# Patient Record
Sex: MALE | Race: BLACK OR AFRICAN AMERICAN | NOT HISPANIC OR LATINO | URBAN - METROPOLITAN AREA
[De-identification: names, ages, dates, MRNs, and addresses within clinical notes are randomized per-mention and may not be internally consistent; named-entity substitution may affect disease eponyms.]

---

## 2020-07-14 ENCOUNTER — INPATIENT (INPATIENT)
Facility: HOSPITAL | Age: 35
LOS: 1 days | Discharge: LEFT BEFORE DISCHARGE | DRG: 812 | End: 2020-07-16
Attending: STUDENT IN AN ORGANIZED HEALTH CARE EDUCATION/TRAINING PROGRAM | Admitting: STUDENT IN AN ORGANIZED HEALTH CARE EDUCATION/TRAINING PROGRAM
Payer: MEDICAID

## 2020-07-14 VITALS
DIASTOLIC BLOOD PRESSURE: 62 MMHG | RESPIRATION RATE: 18 BRPM | SYSTOLIC BLOOD PRESSURE: 143 MMHG | WEIGHT: 175.05 LBS | OXYGEN SATURATION: 100 % | TEMPERATURE: 98 F | HEART RATE: 88 BPM | HEIGHT: 71 IN

## 2020-07-14 DIAGNOSIS — R74.0 NONSPECIFIC ELEVATION OF LEVELS OF TRANSAMINASE AND LACTIC ACID DEHYDROGENASE [LDH]: ICD-10-CM

## 2020-07-14 DIAGNOSIS — R01.1 CARDIAC MURMUR, UNSPECIFIED: ICD-10-CM

## 2020-07-14 DIAGNOSIS — L03.119 CELLULITIS OF UNSPECIFIED PART OF LIMB: ICD-10-CM

## 2020-07-14 DIAGNOSIS — Z98.890 OTHER SPECIFIED POSTPROCEDURAL STATES: Chronic | ICD-10-CM

## 2020-07-14 DIAGNOSIS — R63.8 OTHER SYMPTOMS AND SIGNS CONCERNING FOOD AND FLUID INTAKE: ICD-10-CM

## 2020-07-14 DIAGNOSIS — D69.6 THROMBOCYTOPENIA, UNSPECIFIED: ICD-10-CM

## 2020-07-14 DIAGNOSIS — R60.0 LOCALIZED EDEMA: ICD-10-CM

## 2020-07-14 DIAGNOSIS — D64.9 ANEMIA, UNSPECIFIED: ICD-10-CM

## 2020-07-14 LAB
ALBUMIN SERPL ELPH-MCNC: 3 G/DL — LOW (ref 3.4–5)
ALBUMIN SERPL ELPH-MCNC: 3.5 G/DL — SIGNIFICANT CHANGE UP (ref 3.3–5)
ALP SERPL-CCNC: 74 U/L — SIGNIFICANT CHANGE UP (ref 40–120)
ALP SERPL-CCNC: 84 U/L — SIGNIFICANT CHANGE UP (ref 40–120)
ALT FLD-CCNC: 42 U/L — SIGNIFICANT CHANGE UP (ref 10–45)
ALT FLD-CCNC: 66 U/L — HIGH (ref 12–42)
ANION GAP SERPL CALC-SCNC: 11 MMOL/L — SIGNIFICANT CHANGE UP (ref 9–16)
ANION GAP SERPL CALC-SCNC: 9 MMOL/L — SIGNIFICANT CHANGE UP (ref 5–17)
APPEARANCE UR: CLEAR — SIGNIFICANT CHANGE UP
APTT BLD: 27.9 SEC — SIGNIFICANT CHANGE UP (ref 27.5–35.5)
AST SERPL-CCNC: 44 U/L — HIGH (ref 10–40)
AST SERPL-CCNC: 63 U/L — HIGH (ref 15–37)
BASOPHILS # BLD AUTO: 0 K/UL — SIGNIFICANT CHANGE UP (ref 0–0.2)
BASOPHILS NFR BLD AUTO: 0 % — SIGNIFICANT CHANGE UP (ref 0–2)
BILIRUB DIRECT SERPL-MCNC: 0.1 MG/DL — SIGNIFICANT CHANGE UP
BILIRUB INDIRECT FLD-MCNC: 0.2 MG/DL — SIGNIFICANT CHANGE UP (ref 0.2–1)
BILIRUB SERPL-MCNC: 0.2 MG/DL — SIGNIFICANT CHANGE UP (ref 0.2–1.2)
BILIRUB SERPL-MCNC: 0.3 MG/DL — SIGNIFICANT CHANGE UP (ref 0.2–1.2)
BILIRUB UR-MCNC: NEGATIVE — SIGNIFICANT CHANGE UP
BLD GP AB SCN SERPL QL: NEGATIVE — SIGNIFICANT CHANGE UP
BLD GP AB SCN SERPL QL: NEGATIVE — SIGNIFICANT CHANGE UP
BUN SERPL-MCNC: 10 MG/DL — SIGNIFICANT CHANGE UP (ref 7–23)
BUN SERPL-MCNC: 13 MG/DL — SIGNIFICANT CHANGE UP (ref 7–23)
CALCIUM SERPL-MCNC: 8.5 MG/DL — SIGNIFICANT CHANGE UP (ref 8.4–10.5)
CALCIUM SERPL-MCNC: 8.7 MG/DL — SIGNIFICANT CHANGE UP (ref 8.5–10.5)
CHLORIDE SERPL-SCNC: 103 MMOL/L — SIGNIFICANT CHANGE UP (ref 96–108)
CHLORIDE SERPL-SCNC: 106 MMOL/L — SIGNIFICANT CHANGE UP (ref 96–108)
CLOSURE TME COLL+EPINEP BLD: 220 K/UL — SIGNIFICANT CHANGE UP (ref 150–400)
CO2 SERPL-SCNC: 26 MMOL/L — SIGNIFICANT CHANGE UP (ref 22–31)
CO2 SERPL-SCNC: 26 MMOL/L — SIGNIFICANT CHANGE UP (ref 22–31)
COLOR SPEC: YELLOW — SIGNIFICANT CHANGE UP
CREAT SERPL-MCNC: 0.9 MG/DL — SIGNIFICANT CHANGE UP (ref 0.5–1.3)
CREAT SERPL-MCNC: 1.03 MG/DL — SIGNIFICANT CHANGE UP (ref 0.5–1.3)
CRP SERPL-MCNC: 5.5 MG/DL — HIGH (ref 0–0.9)
D DIMER BLD IA.RAPID-MCNC: 237 NG/ML DDU — HIGH
DIFF PNL FLD: NEGATIVE — SIGNIFICANT CHANGE UP
EOSINOPHIL # BLD AUTO: 0.13 K/UL — SIGNIFICANT CHANGE UP (ref 0–0.5)
EOSINOPHIL NFR BLD AUTO: 1.8 % — SIGNIFICANT CHANGE UP (ref 0–6)
FERRITIN SERPL-MCNC: 16 NG/ML — LOW (ref 30–400)
GLUCOSE SERPL-MCNC: 116 MG/DL — HIGH (ref 70–99)
GLUCOSE SERPL-MCNC: 84 MG/DL — SIGNIFICANT CHANGE UP (ref 70–99)
GLUCOSE UR QL: NEGATIVE — SIGNIFICANT CHANGE UP
HAPTOGLOB SERPL-MCNC: 90 MG/DL — SIGNIFICANT CHANGE UP (ref 34–200)
HCT VFR BLD CALC: 19.1 % — CRITICAL LOW (ref 39–50)
HCT VFR BLD CALC: 20.6 % — CRITICAL LOW (ref 39–50)
HCT VFR BLD CALC: 22.5 % — LOW (ref 39–50)
HGB BLD-MCNC: 5.6 G/DL — CRITICAL LOW (ref 13–17)
HGB BLD-MCNC: 6.1 G/DL — CRITICAL LOW (ref 13–17)
HGB BLD-MCNC: 6.4 G/DL — CRITICAL LOW (ref 13–17)
HIV 1 & 2 AB SERPL IA.RAPID: SIGNIFICANT CHANGE UP
INR BLD: 1.03 — SIGNIFICANT CHANGE UP (ref 0.88–1.16)
IRON SATN MFR SERPL: 12 UG/DL — LOW (ref 45–165)
IRON SATN MFR SERPL: 3 % — LOW (ref 16–55)
KETONES UR-MCNC: NEGATIVE — SIGNIFICANT CHANGE UP
LDH SERPL L TO P-CCNC: 288 U/L — HIGH (ref 50–242)
LEUKOCYTE ESTERASE UR-ACNC: ABNORMAL
LYMPHOCYTES # BLD AUTO: 1.75 K/UL — SIGNIFICANT CHANGE UP (ref 1–3.3)
LYMPHOCYTES # BLD AUTO: 24.1 % — SIGNIFICANT CHANGE UP (ref 13–44)
MAGNESIUM SERPL-MCNC: 1.9 MG/DL — SIGNIFICANT CHANGE UP (ref 1.6–2.6)
MCHC RBC-ENTMCNC: 24.3 PG — LOW (ref 27–34)
MCHC RBC-ENTMCNC: 28.4 GM/DL — LOW (ref 32–36)
MCHC RBC-ENTMCNC: 29.3 GM/DL — LOW (ref 32–36)
MCHC RBC-ENTMCNC: 29.6 GM/DL — LOW (ref 32–36)
MCV RBC AUTO: 82.1 FL — SIGNIFICANT CHANGE UP (ref 80–100)
MCV RBC AUTO: 83 FL — SIGNIFICANT CHANGE UP (ref 80–100)
MCV RBC AUTO: 85.6 FL — SIGNIFICANT CHANGE UP (ref 80–100)
MONOCYTES # BLD AUTO: 0.65 K/UL — SIGNIFICANT CHANGE UP (ref 0–0.9)
MONOCYTES NFR BLD AUTO: 8.9 % — SIGNIFICANT CHANGE UP (ref 2–14)
NEUTROPHILS # BLD AUTO: 4.74 K/UL — SIGNIFICANT CHANGE UP (ref 1.8–7.4)
NEUTROPHILS NFR BLD AUTO: 64.3 % — SIGNIFICANT CHANGE UP (ref 43–77)
NITRITE UR-MCNC: NEGATIVE — SIGNIFICANT CHANGE UP
NRBC # BLD: 0 /100 WBCS — SIGNIFICANT CHANGE UP (ref 0–0)
NRBC # BLD: 0 /100 WBCS — SIGNIFICANT CHANGE UP (ref 0–0)
NT-PROBNP SERPL-SCNC: 176 PG/ML — SIGNIFICANT CHANGE UP
OB PNL STL: NEGATIVE — SIGNIFICANT CHANGE UP
PCP SPEC-MCNC: SIGNIFICANT CHANGE UP
PH UR: 7 — SIGNIFICANT CHANGE UP (ref 5–8)
PHOSPHATE SERPL-MCNC: 3.2 MG/DL — SIGNIFICANT CHANGE UP (ref 2.5–4.5)
PLATELET # BLD AUTO: 298 K/UL — SIGNIFICANT CHANGE UP (ref 150–400)
PLATELET # BLD AUTO: 312 K/UL — SIGNIFICANT CHANGE UP (ref 150–400)
PLATELET # BLD AUTO: 99 K/UL — LOW (ref 150–400)
POTASSIUM SERPL-MCNC: 3.8 MMOL/L — SIGNIFICANT CHANGE UP (ref 3.5–5.3)
POTASSIUM SERPL-MCNC: 3.9 MMOL/L — SIGNIFICANT CHANGE UP (ref 3.5–5.3)
POTASSIUM SERPL-SCNC: 3.8 MMOL/L — SIGNIFICANT CHANGE UP (ref 3.5–5.3)
POTASSIUM SERPL-SCNC: 3.9 MMOL/L — SIGNIFICANT CHANGE UP (ref 3.5–5.3)
PROT SERPL-MCNC: 6.4 G/DL — SIGNIFICANT CHANGE UP (ref 6–8.3)
PROT SERPL-MCNC: 7.3 G/DL — SIGNIFICANT CHANGE UP (ref 6.4–8.2)
PROT UR-MCNC: NEGATIVE MG/DL — SIGNIFICANT CHANGE UP
PROTHROM AB SERPL-ACNC: 12.3 SEC — SIGNIFICANT CHANGE UP (ref 10.6–13.6)
RBC # BLD: 2.3 M/UL — LOW (ref 4.2–5.8)
RBC # BLD: 2.36 M/UL — LOW (ref 4.2–5.8)
RBC # BLD: 2.51 M/UL — LOW (ref 4.2–5.8)
RBC # BLD: 2.63 M/UL — LOW (ref 4.2–5.8)
RBC # FLD: 17.7 % — HIGH (ref 10.3–14.5)
RBC # FLD: 17.8 % — HIGH (ref 10.3–14.5)
RBC # FLD: 17.9 % — HIGH (ref 10.3–14.5)
RETICS #: 20.1 K/UL — LOW (ref 25–125)
RETICS/RBC NFR: 0.9 % — SIGNIFICANT CHANGE UP (ref 0.5–2.5)
RH IG SCN BLD-IMP: NEGATIVE — SIGNIFICANT CHANGE UP
RH IG SCN BLD-IMP: NEGATIVE — SIGNIFICANT CHANGE UP
SARS-COV-2 RNA SPEC QL NAA+PROBE: SIGNIFICANT CHANGE UP
SODIUM SERPL-SCNC: 140 MMOL/L — SIGNIFICANT CHANGE UP (ref 132–145)
SODIUM SERPL-SCNC: 141 MMOL/L — SIGNIFICANT CHANGE UP (ref 135–145)
SP GR SPEC: 1.02 — SIGNIFICANT CHANGE UP (ref 1–1.03)
TIBC SERPL-MCNC: 437 UG/DL — HIGH (ref 220–430)
TRANSFERRIN SERPL-MCNC: 351 MG/DL — SIGNIFICANT CHANGE UP (ref 200–360)
TROPONIN I SERPL-MCNC: <0.017 NG/ML — LOW (ref 0.02–0.06)
UIBC SERPL-MCNC: 425 UG/DL — HIGH (ref 110–370)
UROBILINOGEN FLD QL: 1 E.U./DL — SIGNIFICANT CHANGE UP
WBC # BLD: 6.31 K/UL — SIGNIFICANT CHANGE UP (ref 3.8–10.5)
WBC # BLD: 7.27 K/UL — SIGNIFICANT CHANGE UP (ref 3.8–10.5)
WBC # BLD: 7.81 K/UL — SIGNIFICANT CHANGE UP (ref 3.8–10.5)
WBC # FLD AUTO: 6.31 K/UL — SIGNIFICANT CHANGE UP (ref 3.8–10.5)
WBC # FLD AUTO: 7.27 K/UL — SIGNIFICANT CHANGE UP (ref 3.8–10.5)
WBC # FLD AUTO: 7.81 K/UL — SIGNIFICANT CHANGE UP (ref 3.8–10.5)

## 2020-07-14 PROCEDURE — 71045 X-RAY EXAM CHEST 1 VIEW: CPT | Mod: 26

## 2020-07-14 PROCEDURE — 93970 EXTREMITY STUDY: CPT | Mod: 26

## 2020-07-14 PROCEDURE — 99223 1ST HOSP IP/OBS HIGH 75: CPT | Mod: GC

## 2020-07-14 PROCEDURE — 99285 EMERGENCY DEPT VISIT HI MDM: CPT | Mod: 25

## 2020-07-14 RX ORDER — AZITHROMYCIN 500 MG/1
1000 TABLET, FILM COATED ORAL ONCE
Refills: 0 | Status: COMPLETED | OUTPATIENT
Start: 2020-07-14 | End: 2020-07-14

## 2020-07-14 RX ORDER — PANTOPRAZOLE SODIUM 20 MG/1
40 TABLET, DELAYED RELEASE ORAL
Refills: 0 | Status: DISCONTINUED | OUTPATIENT
Start: 2020-07-14 | End: 2020-07-16

## 2020-07-14 RX ORDER — FUROSEMIDE 40 MG
40 TABLET ORAL ONCE
Refills: 0 | Status: COMPLETED | OUTPATIENT
Start: 2020-07-14 | End: 2020-07-14

## 2020-07-14 RX ORDER — VANCOMYCIN HCL 1 G
1250 VIAL (EA) INTRAVENOUS EVERY 12 HOURS
Refills: 0 | Status: DISCONTINUED | OUTPATIENT
Start: 2020-07-14 | End: 2020-07-15

## 2020-07-14 RX ORDER — FUROSEMIDE 40 MG
40 TABLET ORAL ONCE
Refills: 0 | Status: DISCONTINUED | OUTPATIENT
Start: 2020-07-14 | End: 2020-07-14

## 2020-07-14 RX ORDER — PIPERACILLIN AND TAZOBACTAM 4; .5 G/20ML; G/20ML
4.5 INJECTION, POWDER, LYOPHILIZED, FOR SOLUTION INTRAVENOUS EVERY 6 HOURS
Refills: 0 | Status: DISCONTINUED | OUTPATIENT
Start: 2020-07-14 | End: 2020-07-15

## 2020-07-14 RX ORDER — KETOROLAC TROMETHAMINE 30 MG/ML
30 SYRINGE (ML) INJECTION ONCE
Refills: 0 | Status: DISCONTINUED | OUTPATIENT
Start: 2020-07-14 | End: 2020-07-14

## 2020-07-14 RX ORDER — PANTOPRAZOLE SODIUM 20 MG/1
40 TABLET, DELAYED RELEASE ORAL ONCE
Refills: 0 | Status: COMPLETED | OUTPATIENT
Start: 2020-07-14 | End: 2020-07-14

## 2020-07-14 RX ORDER — CEFTRIAXONE 500 MG/1
250 INJECTION, POWDER, FOR SOLUTION INTRAMUSCULAR; INTRAVENOUS ONCE
Refills: 0 | Status: COMPLETED | OUTPATIENT
Start: 2020-07-14 | End: 2020-07-14

## 2020-07-14 RX ORDER — KETOROLAC TROMETHAMINE 30 MG/ML
60 SYRINGE (ML) INJECTION ONCE
Refills: 0 | Status: DISCONTINUED | OUTPATIENT
Start: 2020-07-14 | End: 2020-07-14

## 2020-07-14 RX ADMIN — PIPERACILLIN AND TAZOBACTAM 200 GRAM(S): 4; .5 INJECTION, POWDER, LYOPHILIZED, FOR SOLUTION INTRAVENOUS at 23:50

## 2020-07-14 RX ADMIN — Medication 40 MILLIGRAM(S): at 07:06

## 2020-07-14 RX ADMIN — Medication 100 MILLIGRAM(S): at 07:24

## 2020-07-14 RX ADMIN — CEFTRIAXONE 250 MILLIGRAM(S): 500 INJECTION, POWDER, FOR SOLUTION INTRAMUSCULAR; INTRAVENOUS at 07:24

## 2020-07-14 RX ADMIN — AZITHROMYCIN 1000 MILLIGRAM(S): 500 TABLET, FILM COATED ORAL at 07:24

## 2020-07-14 RX ADMIN — PIPERACILLIN AND TAZOBACTAM 200 GRAM(S): 4; .5 INJECTION, POWDER, LYOPHILIZED, FOR SOLUTION INTRAVENOUS at 18:01

## 2020-07-14 RX ADMIN — Medication 166.67 MILLIGRAM(S): at 18:37

## 2020-07-14 RX ADMIN — PANTOPRAZOLE SODIUM 40 MILLIGRAM(S): 20 TABLET, DELAYED RELEASE ORAL at 08:22

## 2020-07-14 NOTE — ED PROVIDER NOTE - OBJECTIVE STATEMENT
34 yo M with PMHx of varicose vein in BLE, cellulitis, peripheral edema, undomiciled, seen and treated at [facility] 1.5 months ago for BLE cellulitis and swelling, finished course of oral abx at that time but still with chronic peripheral edema, presenting today c/o worsening LLE edema x 4d.  Pt reports worsening pain, swelling to the LLE since few days ago.  Denies trauma, fall, wound, redness, rash, CP, SOB, palpitations, N/V/D/C, insect bite, focal weakness, cough, SALAS, wheezing, malaise, and fever/chills. 34 yo M with PMHx of varicose vein in BLE, cellulitis, peripheral edema, IVDU in the past, undomiciled, seen and treated at [facility] 1.5 months ago for BLE cellulitis and swelling, finished course of oral abx at that time but still with chronic peripheral edema, presenting today c/o worsening LLE edema x 4d.  Pt reports worsening pain, swelling to the LLE since few days ago.  Denies trauma, fall, wound, redness, rash, CP, SOB, palpitations, N/V/D/C, insect bite, focal weakness, cough, SALAS, wheezing, malaise, and fever/chills. 36 yo M with PMHx of varicose vein in BLE, cellulitis, peripheral edema, IVDU (last used - heroin this morning), undomiciled, seen and treated at [facility] 1.5 months ago for BLE cellulitis and swelling, finished course of oral abx at that time but still with chronic peripheral edema, presenting today c/o worsening LLE edema x 4d.  Pt reports worsening pain, swelling to the LLE since few days ago.  Denies trauma, fall, wound, redness, rash, CP, SOB, palpitations, N/V/D/C, insect bite, focal weakness, cough, wheezing, malaise, and fever/chills. Of note, pt also reports having yellowish penile dc x few days with dysuria.  Currently sexually active with multiple female partner without protection.  Last tested for STI at [facility] but wasn't given results. 34 yo M with PMHx of varicose vein in BLE, cellulitis, peripheral edema, IVDU (last used - heroin this morning), undomiciled, seen and treated at [facility] 1.5 months ago for BLE cellulitis and swelling, finished course of oral abx at that time but still with chronic peripheral edema, presenting today c/o worsening LLE edema x 4d.  Pt reports worsening pain, swelling to the LLE since few days ago.  Denies trauma, fall, wound, redness, rash, CP, SOB, palpitations, N/V/D/C, insect bite, focal weakness, cough, wheezing, malaise, and fever/chills. Of note, pt also reports having yellowish penile dc x few days with dysuria.  Currently sexually active with multiple female partner without protection.  Last tested for STI at [facility] but wasn't given results. Denies rash, hematuria, HA, dizziness, abdominal pain, change in urinary/bowel function, focal weakness, paresthesia, and malaise.

## 2020-07-14 NOTE — ED PROVIDER NOTE - PROGRESS NOTE DETAILS
H/H noted to be 6/18 today, pt reassessed and reports recent dx with anemia during hospitalization at [facility] - s/p 4U PRBC transfusion approximately 1 month ago.  No GI w/u noted, no melena or hematochezia, but does endorse progressive worsening lightheadedness, SALAS and fatigue in the past week.  Pt is otherwise a poor historian.  Will add on EKG, CXR, stool guaiac, STD screening and CE to r/o high output cardiac failure endorsed to medicine team. Patient accepted to regional medicine H/H noted to be 6/18 today, pt reassessed and reports recent dx with anemia during hospitalization at [facility] - s/p 4U PRBC transfusion approximately 1 month ago.  No GI w/u noted, no melena or hematochezia, but does endorse progressive worsening lightheadedness, SALAS and fatigue in the past week.  Pt is otherwise a poor historian.  Will add on EKG, CXR, stool guaiac, STD screening and CE to r/o high output cardiac failure.  Signed out to day team pending labs, u/a and US.  Will need admission for symptomatic anemia. Pt amenable to admission to [facility]

## 2020-07-14 NOTE — H&P ADULT - NSHPPHYSICALEXAM_GEN_ALL_CORE
VITAL SIGNS:  T(F): 98.3 (07-14-20 @ 10:51), Max: 98.5 (07-14-20 @ 09:32)  HR: 71 (07-14-20 @ 10:51) (71 - 88)  BP: 137/87 (07-14-20 @ 10:51) (122/69 - 143/62)  RR: 18 (07-14-20 @ 10:51) (16 - 18)  SpO2: 99% (07-14-20 @ 10:51) (97% - 100%)    PHYSICAL EXAM:    Constitutional: Pt very sleepy during exam, arousable by calling pt name.   Head: NC/AT  Eyes: PERRL, EOMI, clear conjunctiva  ENT: no nasal discharge; uvula midline, no oropharyngeal erythema or exudates; MMM  Neck: supple; no JVD or thyromegaly  Respiratory: CTA B/L; no W/R/R, no retractions  Cardiac: +S1/S2; RRR; no M/R/G;  Gastrointestinal: soft, NT/ND; no rebound or guarding; +BSx4  Extremities: WWP, no clubbing or cyanosis; no peripheral edema  Musculoskeletal: NROM x4; no joint swelling, tenderness or erythema  Vascular: 2+ radial, femoral, DP/PT pulses B/L  Dermatologic: skin warm, dry and intact; no rashes, wounds, or scars  Neurologic: AAOx3; CNII-XII grossly intact; no focal deficits  Psychiatric: affect and characteristics of appearance, verbalizations, behaviors are appropriate VITAL SIGNS:  T(F): 98.3 (07-14-20 @ 10:51), Max: 98.5 (07-14-20 @ 09:32)  HR: 71 (07-14-20 @ 10:51) (71 - 88)  BP: 137/87 (07-14-20 @ 10:51) (122/69 - 143/62)  RR: 18 (07-14-20 @ 10:51) (16 - 18)  SpO2: 99% (07-14-20 @ 10:51) (97% - 100%)    PHYSICAL EXAM:    Constitutional: Pt very sleepy during exam, arousable by calling pt name but returns to sleep.   Head: NC/AT  ENT: Poor dentition, MMM  Neck: supple; no JVD or thyromegaly  Respiratory: CTA B/L; no W/R/R, no retractions  Cardiac: +S1/S2; RRR; systolic ejection murmur   Gastrointestinal: soft, NT/ND; no rebound or guarding; +BSx4  Extremities: WWP, b/l LE edema from toes to knees. b/l LE erythema/warmth to the knees.   Vascular: 2+ radial, DP/PT pulses B/L  Neurologic: AAOx3 VITAL SIGNS:  T(F): 98.3 (07-14-20 @ 10:51), Max: 98.5 (07-14-20 @ 09:32)  HR: 71 (07-14-20 @ 10:51) (71 - 88)  BP: 137/87 (07-14-20 @ 10:51) (122/69 - 143/62)  RR: 18 (07-14-20 @ 10:51) (16 - 18)  SpO2: 99% (07-14-20 @ 10:51) (97% - 100%)    PHYSICAL EXAM:    Constitutional: Pt sleepy. Arousable. No acute distress.   Head: NC/AT  ENT: Poor dentition, MMM  Neck: supple; no JVD or thyromegaly  Respiratory: CTA B/L; no W/R/R, no retractions  Cardiac: +S1/S2; RRR; systolic ejection murmur head loudest at apex  Gastrointestinal: soft, NT/ND; no rebound or guarding; +BSx4  : Normal genetalia, no ulcers, no rash, no penile discharge expressed at meatus  Extremities: WWP, b/l LE edema from toes to knees. b/l LE erythema/warmth to the knees (worse on L>R).   Vascular: 2+ radial, DP/PT pulses B/L  Neurologic: AAOx3

## 2020-07-14 NOTE — H&P ADULT - ASSESSMENT
36 yo M, undomiciled, poor historian, hx of IVDU presenting to [facility] with B/L LE swelling and pain of 4-5 days duration and found to have Hb 6.1 on admission with no source of acute bleed at this time. 36 yo M, undomiciled, poor historian, hx of IVDU presenting to [facility] with B/L LE swelling and pain of 4-5 days duration and found to have Hb 6.1 on admission with no source of acute bleed at this time. Patient being admitted to [facility] for management of b/l LE cellulitis and symptomatic anemia. 36 yo M, undomiciled, poor historian, hx of IVDU presenting to [facility] with B/L LE swelling and pain of 4-5 days duration and found to have Hb 6.1 on admission with no source of acute bleed at this time. Patient being admitted to [facility] for management of b/l LE cellulitis and symptomatic anemia.

## 2020-07-14 NOTE — H&P ADULT - NSHPLABSRESULTS_GEN_ALL_CORE
LABS:                         6.1    7.81  )-----------( 99       ( 2020 06:50 )             20.6     -14    140  |  103  |  13  ----------------------------<  84  3.9   |  26  |  1.03    Ca    8.7      2020 06:50    TPro  7.3  /  Alb  3.0<L>  /  TBili  0.3  /  DBili  0.1  /  AST  63<H>  /  ALT  66<H>  /  AlkPhos  84  0714    PT/INR - ( 2020 08:25 )   PT: 12.3 sec;   INR: 1.03          PTT - ( 2020 08:25 )  PTT:27.9 sec  Urinalysis Basic - ( 2020 08:39 )    Color: Yellow / Appearance: Clear / S.020 / pH: x  Gluc: x / Ketone: NEGATIVE  / Bili: NEGATIVE / Urobili: 1.0 E.U./dL   Blood: x / Protein: NEGATIVE mg/dL / Nitrite: NEGATIVE   Leuk Esterase: Small / RBC: < 5 /HPF / WBC Many /HPF   Sq Epi: x / Non Sq Epi: x / Bacteria: Present /HPF      CARDIAC MARKERS ( 2020 07:40 )  <0.017 ng/mL / x     / x     / x     / x          Serum Pro-Brain Natriuretic Peptide: 176 pg/mL ( @ 07:40)        RADIOLOGY, EKG & ADDITIONAL TESTS: Reviewed.     < from: US Duplex Ext Veins Complete, Bilateral (20 @ 09:19) >  No vein thrombosis seen.

## 2020-07-14 NOTE — H&P ADULT - NSHPSOCIALHISTORY_GEN_ALL_CORE
Tobacco use: Current smoker, 4-5 cigarettes per day for past 10 years.  EtOH use: 1 drink per day, unable to elicit further history.   Illicit drug use: Current marijuana use 3x week, current heroin use (last used on AM of admission), current cocaine use (unable to elicit further history)    Living situation: Undomiciled. Able to ambulate on own. Tobacco use: Current smoker, 4-5 cigarettes per day for past 10 years.  EtOH use: 1 drink per day, unable to elicit further history.   Illicit drug use: Current marijuana use 3x week, current heroin use (last used on AM of admission), current cocaine use (unable to elicit further history)    Living situation: Undomiciled, currently staying at home of a friend. Able to ambulate on own.

## 2020-07-14 NOTE — H&P ADULT - PROBLEM/PLAN-1
Patient would like prescriptions written out so she can take them to a pharmacy in [place]. Patient said she would like to come pick these up on Wednesday. Patient can be reached if needed at [phone]   DISPLAY PLAN FREE TEXT

## 2020-07-14 NOTE — ED ADULT NURSE NOTE - CHPI ED NUR SYMPTOMS NEG
no chills/no vomiting/no fever/no nausea/no decreased eating/drinking/no dizziness/no tingling/no weakness

## 2020-07-14 NOTE — H&P ADULT - PROBLEM SELECTOR PLAN 3
Pt with history of LE cellulitis, previously treated with antibiotics at outside facility, now returning with increased b/l lower extremity erythema, pain, and warmth from toes to knees. Doppler US LE negative for DVT.   - f/u soft tissue LE US to assess for fluid collections   - starting pt on Vancomycin (7/14-) and Zosyn (7/14-)   - Monitor CBC/VS for signs of worsening infxn Pt with history of LE cellulitis, previously treated with antibiotics at outside facility, now returning with increased b/l lower extremity erythema, pain, and warmth from toes to knees. Hx of multiple episodes of LE cellulitis. Doppler US LE negative for DVT.   - f/u soft tissue LE US to assess for fluid collections   - f/u BCx   - starting pt on Vancomycin (7/14-) and Zosyn (7/14-), for gram negative coverage   - Monitor CBC/VS for signs of worsening infxn

## 2020-07-14 NOTE — H&P ADULT - PROBLEM SELECTOR PLAN 7
Patient found to have increased LFTs on admission with AST/ALT 63/66. Patient endorses alcohol use. Unclear etiology for transient transaminitis at this time. Repeat LFTs down to 40s.  - Trend LFTs on daily CMP Patient found to have increased LFTs on admission with AST/ALT 63/66. Patient endorses alcohol use. Unclear etiology for transient transaminitis at this time. Repeat LFTs down to 40s.  - Monitor LFTs

## 2020-07-14 NOTE — ED PROVIDER NOTE - CLINICAL SUMMARY MEDICAL DECISION MAKING FREE TEXT BOX
pt p/w progressive worsening BLE edema, L>R, no acute trauma or new wounds noted.  Pt is otherwise a poor historian. Exam suggestive chronic PVD and lymphedema, will obtain labs, US of BLE to r/o DVT, pain control, diuresis, and reassess

## 2020-07-14 NOTE — H&P ADULT - ATTENDING COMMENTS
35 year old man, undomiciled, hx of IVDU, presenting to [facility] with left leg induration, tenderness to palpation and erythema x 5 days after recent course of antibiotics. Found to have Hgb 6.1 in the ED.   History limited by: Patient somnolent at time of interview (respiratory effort adequate)    #Symptomatic anemia (Hypo-proliferative; iron deficient)  -	Odd for a young man with no evidence of GI bleed (CM without melena, FOBT negative) to be iron deficient and to have Hgb ~6 after getting a transfusion at [facility] a month ago   -	Send high sensitivity FOBT or FIT to check for slow GI bleed.   -	Get records from [facility] hospitalization    #Left Leg Cellulitis   -	Has risk factors for MRSA, pseudomonas (recent abx, undomiciled); Vanc and zosyn for now. Assess for improvement  -	Soft tissue ultrasound of left leg to check for drainable collections    #Complaint of purulent penile discharge in the ED  -	f/u STI testing (Chlamydia, GC, syphilis)  -	revisit question of sexual practices when patient more co-operative --- if patient has receptive anal/oral sex, would send G/C swabs from anus or mouth as well.     #Systolic murmur   -	history of IVDU; No old notes or echos in our system. Reasonable to get TTE.  -	Get two separate sets of blood cultures (pt is already on abx, for cellulitis, but if these cultures return +, would ) 35 year old man, undomiciled, hx of IVDU, presenting to [facility] with left leg induration, tenderness to palpation and erythema x 5 days after recent course of antibiotics. Found to have Hgb 6.1 in the ED.   History limited by: Patient somnolent at time of interview (respiratory effort adequate)    #Symptomatic anemia (Hypo-proliferative; iron deficient)  -	Odd for a young man with no evidence of GI bleed (CM without melena, FOBT negative) to be iron deficient and to have Hgb ~6 after getting a transfusion at [facility] a month ago   -	Send high sensitivity FOBT or FIT to check for slow GI bleed.   -	Get records from [facility] hospitalization    #Left Leg Cellulitis   -	Has risk factors for MRSA, pseudomonas (recent abx, undomiciled); Vanc and zosyn for now. Assess for improvement  -	Soft tissue ultrasound of left leg to check for drainable collections    #Complaint of purulent penile discharge in the ED  -	f/u STI testing (Chlamydia, GC, syphilis)  -	revisit question of sexual practices when patient more co-operative --- if patient has receptive anal/oral sex, would send G/C swabs from anus or mouth as well.     #Systolic murmur   -	history of IVDU; No old notes or echos in our system. Reasonable to get TTE.  -	Get two separate sets of blood cultures (pt is already on abx, for cellulitis, but if these cultures return +, would )    # polysubstance use disorder  -  watch for withdrawal

## 2020-07-14 NOTE — ED ADULT TRIAGE NOTE - CHIEF COMPLAINT QUOTE
left lower leg pain for "weeks", swollen from knee to toes, walked in, has been on po abs for leg but unsure when,

## 2020-07-14 NOTE — ED ADULT NURSE NOTE - OBJECTIVE STATEMENT
34 yo M c/o B/L lower leg pain. Pt states "I noticed this swelling from my knees to my toes and pain started like 4-5 days ago." Pt poor historian unable to tell when the swelling began. +Pulse/motor/sensory intact. Pt hx of IV drug abuse, falling asleep during assessment. Denies CP, SOB, N/V/D, headache, dizziness, fever/chills, numbness/tingling, change in bowel or bladder habits. Pt speaking in full complete sentences, ambulatory with steady gait.

## 2020-07-14 NOTE — H&P ADULT - NSHPREVIEWOFSYSTEMS_GEN_ALL_CORE
REVIEW OF SYSTEMS:  CONSTITUTIONAL: (+) subjective fevers/chills, no weakness   NECK: No pain or stiffness  RESPIRATORY: No cough, wheezing, hemoptysis; No shortness of breath  CARDIOVASCULAR: no chest pain or palpitations  GASTROINTESTINAL: No abdominal or epigastric pain. No nausea, vomiting, or hematemesis; No diarrhea or constipation. No melena or hematochezia.  GENITOURINARY: (+) yellow purulent penile discharge, no dysuria, no increased frequency   NEUROLOGICAL: No numbness or weakness  SKIN: No itching, rashes

## 2020-07-14 NOTE — PATIENT PROFILE ADULT - NSPROGENSOURCEINFO_GEN_A_NUR
Pt refused to answer patient profile questions d/t lethargy./lacks capacity and has no surrogate decision maker

## 2020-07-14 NOTE — H&P ADULT - PROBLEM SELECTOR PLAN 5
Patient denies cough, chest pain, diarrhea, anosmia. Denies recent sick contacts or travel.   - f/u COVID-PCR

## 2020-07-14 NOTE — ED PROVIDER NOTE - DIAGNOSTIC INTERPRETATION
Xray (wet reads) interpreted by PHILOMENA BLACK   CXR - Cardiac silhouette, aortic knob, mediastinal and hilar contours appear wnl, no acute consolidation, infiltrate, effusion, or PTX. No bony abnormalities noted

## 2020-07-14 NOTE — H&P ADULT - PROBLEM SELECTOR PLAN 8
F: no IVF  E: K>4 Mg>2, monitor and replete as needed  N: regular diet    DVT ppx: None, currently holding in setting of low Hb with concern for possible bleed   GI ppx: Protonix PO      D: RMF    FULL CODE

## 2020-07-14 NOTE — H&P ADULT - HISTORY OF PRESENT ILLNESS
34 yo M, undomiciled, poor historian, hx of IVDU presenting to [facility] with B/L LE swelling and pain of 4-5 days duration. He states that for the past 4-5 days or so, he has experienced increased swelling and pain of his bilateral lower extremities from the toes up to the knees but has been able to continue ambulating. He endorses recent subjective fevers and chills. He denies chest pain, cough, nausea/vomiting, dysuria, changes in BM.     Upon presentation to the ED, patient was also found to have dyspnea on exertion with initial labs revealing Hb 6.1, no acute source of bleed identified. Patient denies hematuria, hematochezia, or any other sources of bleeding.     In the ED,  VS: T 98F, HR 88, /62, RR 18, SpO2 100%  Labs: Hb 6.1, Plt 99, D-dimer 237, Alb 3.0, CRP 5.5,   UTox: Positive for THC, cocaine, opiates   EKG:  Imaging: Doppler LE (7/14) negative for DVT.   Orders: Azithro 1g PO, CTX 250mg IM, Doxy 100mg PO, Lasix 40mg, Protonix 40mg IV 36 yo M, undomiciled, poor historian, hx of IVDU presenting to [facility] with B/L LE swelling and pain of 4-5 days duration. He states that for the past 4-5 days or so, he has experienced increased swelling and pain of his bilateral lower extremities from the toes up to the knees but has been able to continue ambulating. He endorses recent subjective fevers and chills. He denies chest pain, cough, nausea/vomiting, dysuria, changes in BM.     Upon presentation to the ED, patient was also found to have dyspnea on exertion with initial labs revealing Hb 6.1, no acute source of bleed identified. Patient denies hematuria, hematochezia, or any other sources of bleeding.     In the ED,  VS: T 98F, HR 88, /62, RR 18, SpO2 100%  Labs: Hb 6.1, Plt 99, D-dimer 237, Alb 3.0, CRP 5.5,   UTox: Positive for THC, cocaine, opiates   Imaging: Doppler LE (7/14) negative for DVT.   Orders: Azithro 1g PO, CTX 250mg IM, Doxy 100mg PO, Lasix 40mg, Protonix 40mg IV 36 yo M, undomiciled, poor historian, hx of IVDU presenting to [facility] with B/L LE swelling and pain of 4-5 days duration. He states that for the past 4-5 days or so, he has experienced increased swelling and pain of his bilateral lower extremities from the toes up to the knees but has been able to continue ambulating. He endorses recent subjective fevers and chills. He denies chest pain, cough, nausea/vomiting, dysuria, changes in BM.     Upon presentation to the ED, patient was also found to have dyspnea on exertion with initial labs revealing Hb 6.1, no acute source of bleed identified. Patient was seen approx 1 mon     Patient denies hematuria, hematochezia, or any other sources of bleeding.     In the ED,  VS: T 98F, HR 88, /62, RR 18, SpO2 100%  Labs: Hb 6.1, Plt 99, D-dimer 237, Alb 3.0, CRP 5.5,   UTox: Positive for THC, cocaine, opiates   Imaging: Doppler LE (7/14) negative for DVT.   Orders: Azithro 1g PO, CTX 250mg IM, Doxy 100mg PO, Lasix 40mg, Protonix 40mg IV 36 yo M, undomiciled, poor historian, hx of IVDU presenting to [facility] with B/L LE swelling and pain of 4-5 days duration. He states that for the past 4-5 days or so, he has experienced increased swelling and pain of his bilateral lower extremities from the toes up to the knees but has been able to continue ambulating. He endorses recent subjective fevers and chills. He denies chest pain, cough, nausea/vomiting, dysuria, changes in BM.     Upon presentation to the ED, patient was also found to have dyspnea on exertion with initial labs revealing Hb 6.1, no acute source of bleed identified. Per chart review, patient was seen approx 1 mon at [facility] and transfused 4 U pRBC, no source of bleed identified. Patient currently denies hematuria, hematochezia, or any other sources of bleeding.     In the ED,  VS: T 98F, HR 88, /62, RR 18, SpO2 100%  Labs: Hb 6.1, Plt 99, D-dimer 237, Alb 3.0, CRP 5.5,   UTox: Positive for THC, cocaine, opiates   Imaging: Doppler LE (7/14) negative for DVT.   Orders: Azithro 1g PO, CTX 250mg IM, Doxy 100mg PO, Lasix 40mg, Protonix 40mg IV 36 yo M, undomiciled, poor historian, hx of IVDU presenting to [facility] with B/L LE swelling and pain of 4-5 days duration. He states that for the past 4-5 days or so, he has experienced increased swelling and pain of his bilateral lower extremities from the toes up to the knees but has been able to continue ambulating. He endorses recent subjective fevers and chills. He denies chest pain, cough, nausea/vomiting, dysuria, changes in BM. Patient reports being treated multiple times in the past for LE cellulitis.     Upon presentation to the ED, patient was also found to have dyspnea on exertion with initial labs revealing Hb 6.1, no acute source of bleed identified. Per chart review, patient was seen approx 1 mon at [facility] and transfused 4 U pRBC, no source of bleed identified. Patient currently denies hematuria, hematemesis, nose bleeds, hematochezia, melena, or any other sources of bleeding.     In the ED,  VS: T 98F, HR 88, /62, RR 18, SpO2 100%  Labs: Hb 6.1, Plt 99, D-dimer 237, Alb 3.0, CRP 5.5,   UTox: Positive for THC, cocaine, opiates   Imaging: Doppler LE (7/14) negative for DVT.   Orders: Azithro 1g PO, CTX 250mg IM, Doxy 100mg PO, Lasix 40mg, Protonix 40mg IV

## 2020-07-14 NOTE — H&P ADULT - PROBLEM SELECTOR PLAN 6
Patient found to have systolic ejection murmur at bedside. Given this finding in conjunction with patient history of current IVDU, current consideration for rule out endocarditis.   - f/u TTE Patient found to have systolic ejection murmur at bedside. PT denies known history of murmur or other cardiac conditions. Given this finding in conjunction with patient history of current IVDU, current consideration for rule out endocarditis.   - f/u TTE

## 2020-07-14 NOTE — H&P ADULT - PROBLEM SELECTOR PLAN 4
Patient with history of multiple sexual partners and inconsistent use of protection now presenting with positive UA and endorsing symptoms of yellow, purulent discharge from the penis. No rashes or ulcers. Rapid HIV nonreactive. Pt received azithromycin 1g PO, CTX 250mg IM, and doxy 100 mg PO in the ED.  - f/u Chlamydia, GC, syphilis studies   - f/u urine cx

## 2020-07-14 NOTE — ED PROVIDER NOTE - PHYSICAL EXAMINATION
Gen - WDWN, NAD, comfortable and non-toxic appearing  Skin - warm, dry, intact, chronic scabs to BLE, no active bleeding or dc or rash   HEENT - AT/NC, airway patent, neck supple   CV - S1S2, R/R/R  Resp - CTAB, no r/r/w  GI - soft, ND, NT, no CVAT b/l   MS - w/w/p, 2+RLE pitting edema, 3+LLE pitting edema, both calves TTP, no erythema, warmth, crepitus, streaking, fluctuance, or dc, NV Intact, FROM, +SILT   Neuro - AxOx3, ambulatory without gait disturbance Gen - WDWN M, NAD, comfortable and non-toxic appearing  Skin - warm, dry, intact, chronic scabs to BLE, no active bleeding or dc or rash   HEENT - AT/NC, conjunctiva pale b/l, EOMI, PERRL, o/p clear without erythema/exudate, airway patent, neck supple, no JVD b/l   CV - S1S2, R/R/R  Resp - CTAB, no r/r/w, no respiratory distress, tripoding or accessory muscle use   GI - soft, ND, NT, no CVAT b/l   MS - w/w/p, 2+RLE pitting edema, 3+LLE pitting edema, both calves TTP, no erythema, warmth, crepitus, streaking, fluctuance, or dc, NV Intact, FROM, +SILT   Neuro - AxOx3, ambulatory without gait disturbance Gen - WDWN M, NAD, somnolent but arousable to verbal stimuli, comfortable and non-toxic appearing  Skin - warm, dry, intact, chronic scabs to BLE, no active bleeding or dc or rash   HEENT - AT/NC, conjunctiva pale b/l, EOMI, PERRL, o/p clear without erythema/exudate, airway patent, neck supple, no JVD b/l   CV - S1S2, R/R/R  Resp - CTAB, no r/r/w, no respiratory distress, tripoding or accessory muscle use   GI - soft, ND, NT, no CVAT b/l    - rectal tone intact, brown stool, guaiac negative, external genitalia wnl, no testicular or scrotal edema, erythema, crepitus, ecchymosis or tenderness, scant amount of yellowish dc from meatus, no bleeding, rash, or ulceration noted, cremasteric reflex intact b/l   MS - w/w/p, 2+RLE pitting edema, 3+LLE pitting edema, both calves TTP, no erythema, warmth, crepitus, streaking, fluctuance, or dc, NV Intact, FROM, +SILT   Neuro - AxOx3, no focal neuro deficits, ambulatory without gait disturbance Gen - WDWN M, NAD, somnolent but arousable to verbal stimuli, comfortable and non-toxic appearing  Skin - warm, dry, intact, chronic scabs to BLE, no active bleeding or dc or rash   HEENT - AT/NC, conjunctiva pale b/l, EOMI, PERRL, o/p clear without erythema/exudate, airway patent, neck supple, no JVD b/l   CV - S1S2, R/R/R  Resp - CTAB, no r/r/w, no respiratory distress, tripoding or accessory muscle use   GI - soft, ND, NT, no CVAT b/l    - rectal tone intact, brown stool, guaiac negative, external genitalia wnl, no testicular or scrotal edema, erythema, crepitus, ecchymosis or tenderness, scant amount of yellowish dc from meatus, no bleeding, rash, or ulceration noted, cremasteric reflex intact b/l   MS - w/w/p, 2+RLE pitting edema, 3+LLE pitting edema, both calves TTP, no erythema, warmth, crepitus, streaking, fluctuance, or dc, NV Intact, FROM, +SILT, +palpable enlarged LN in b/l inguinal region, NT, no focal fluctuance or crepitus   Neuro - AxOx3, no focal neuro deficits, ambulatory without gait disturbance

## 2020-07-14 NOTE — H&P ADULT - PROBLEM SELECTOR PLAN 1
On admission, ED labs showing patient anemic with Hb 6.1, no acute source of bleed at this time. Patient with hx of recent 4 uPRBC blood transfusion approx 1 mon prior at [facility].   - Transfuse 1 uPRBCs   - Obtain post-transfusion CBC  - Maintain Hb >7  - Maintain active T/S  - f/u iron studies, B12, folate  - On admission, ED labs showing patient anemic with Hb 6.1, no acute source of bleed at this time. Patient with hx of recent 4 uPRBC blood transfusion approx 1 mon prior at [facility].   - Transfuse 1 uPRBCs   - Obtain post-transfusion CBC  - Maintain Hb >7  - Maintain active T/S  - f/u iron studies, B12, folate   - f/u hemolysis labs (haptoglobin, LDH, peripheral smear) On admission, ED labs showing patient anemic with Hb 6.1, 5.6 on repeat, pt denies acute source of bleed at this time. FOBT neg, brown stool on ED rectal exam. Patient with hx of recent 4 uPRBC blood transfusion approx 1 mon prior at [facility].   - Transfuse 1 uPRBCs   - F/u post-transfusion CBC  - Maintain Hb >7  - Maintain active T/S  - f/u iron studies, B12, folate   - f/u hemolysis labs (haptoglobin, LDH, peripheral smear)

## 2020-07-15 DIAGNOSIS — L03.90 CELLULITIS, UNSPECIFIED: ICD-10-CM

## 2020-07-15 DIAGNOSIS — D64.9 ANEMIA, UNSPECIFIED: ICD-10-CM

## 2020-07-15 LAB
ALBUMIN SERPL ELPH-MCNC: 3 G/DL — LOW (ref 3.3–5)
ALP SERPL-CCNC: 65 U/L — SIGNIFICANT CHANGE UP (ref 40–120)
ALT FLD-CCNC: 34 U/L — SIGNIFICANT CHANGE UP (ref 10–45)
ANION GAP SERPL CALC-SCNC: 10 MMOL/L — SIGNIFICANT CHANGE UP (ref 5–17)
AST SERPL-CCNC: 33 U/L — SIGNIFICANT CHANGE UP (ref 10–40)
BASOPHILS # BLD AUTO: 0.01 K/UL — SIGNIFICANT CHANGE UP (ref 0–0.2)
BASOPHILS NFR BLD AUTO: 0.2 % — SIGNIFICANT CHANGE UP (ref 0–2)
BILIRUB DIRECT SERPL-MCNC: <0.2 MG/DL — SIGNIFICANT CHANGE UP (ref 0–0.2)
BILIRUB INDIRECT FLD-MCNC: >0.6 MG/DL — SIGNIFICANT CHANGE UP (ref 0.2–1)
BILIRUB SERPL-MCNC: 0.8 MG/DL — SIGNIFICANT CHANGE UP (ref 0.2–1.2)
BILIRUB SERPL-MCNC: 0.8 MG/DL — SIGNIFICANT CHANGE UP (ref 0.2–1.2)
BUN SERPL-MCNC: 9 MG/DL — SIGNIFICANT CHANGE UP (ref 7–23)
C TRACH RRNA SPEC QL NAA+PROBE: SIGNIFICANT CHANGE UP
CALCIUM SERPL-MCNC: 8.4 MG/DL — SIGNIFICANT CHANGE UP (ref 8.4–10.5)
CHLORIDE SERPL-SCNC: 107 MMOL/L — SIGNIFICANT CHANGE UP (ref 96–108)
CO2 SERPL-SCNC: 25 MMOL/L — SIGNIFICANT CHANGE UP (ref 22–31)
CREAT SERPL-MCNC: 0.98 MG/DL — SIGNIFICANT CHANGE UP (ref 0.5–1.3)
CULTURE RESULTS: NO GROWTH — SIGNIFICANT CHANGE UP
DAT POLY-SP REAG RBC QL: NEGATIVE — SIGNIFICANT CHANGE UP
EOSINOPHIL # BLD AUTO: 0.16 K/UL — SIGNIFICANT CHANGE UP (ref 0–0.5)
EOSINOPHIL NFR BLD AUTO: 2.4 % — SIGNIFICANT CHANGE UP (ref 0–6)
FOLATE SERPL-MCNC: 13.4 NG/ML — SIGNIFICANT CHANGE UP
GLUCOSE SERPL-MCNC: 110 MG/DL — HIGH (ref 70–99)
HAPTOGLOB SERPL-MCNC: 78 MG/DL — SIGNIFICANT CHANGE UP (ref 34–200)
HCT VFR BLD CALC: 22.7 % — LOW (ref 39–50)
HCT VFR BLD CALC: 25.2 % — LOW (ref 39–50)
HCV AB S/CO SERPL IA: 17.92 S/CO — SIGNIFICANT CHANGE UP
HCV AB SERPL-IMP: REACTIVE
HGB BLD-MCNC: 6.9 G/DL — CRITICAL LOW (ref 13–17)
HGB BLD-MCNC: 7.8 G/DL — LOW (ref 13–17)
IMM GRANULOCYTES NFR BLD AUTO: 0.5 % — SIGNIFICANT CHANGE UP (ref 0–1.5)
LDH SERPL L TO P-CCNC: 281 U/L — HIGH (ref 50–242)
LYMPHOCYTES # BLD AUTO: 1.2 K/UL — SIGNIFICANT CHANGE UP (ref 1–3.3)
LYMPHOCYTES # BLD AUTO: 18.1 % — SIGNIFICANT CHANGE UP (ref 13–44)
MAGNESIUM SERPL-MCNC: 2 MG/DL — SIGNIFICANT CHANGE UP (ref 1.6–2.6)
MCHC RBC-ENTMCNC: 25.1 PG — LOW (ref 27–34)
MCHC RBC-ENTMCNC: 25.4 PG — LOW (ref 27–34)
MCHC RBC-ENTMCNC: 30.4 GM/DL — LOW (ref 32–36)
MCHC RBC-ENTMCNC: 31 GM/DL — LOW (ref 32–36)
MCV RBC AUTO: 82.1 FL — SIGNIFICANT CHANGE UP (ref 80–100)
MCV RBC AUTO: 82.5 FL — SIGNIFICANT CHANGE UP (ref 80–100)
MONOCYTES # BLD AUTO: 0.67 K/UL — SIGNIFICANT CHANGE UP (ref 0–0.9)
MONOCYTES NFR BLD AUTO: 10.1 % — SIGNIFICANT CHANGE UP (ref 2–14)
N GONORRHOEA RRNA SPEC QL NAA+PROBE: SIGNIFICANT CHANGE UP
NEUTROPHILS # BLD AUTO: 4.56 K/UL — SIGNIFICANT CHANGE UP (ref 1.8–7.4)
NEUTROPHILS NFR BLD AUTO: 68.7 % — SIGNIFICANT CHANGE UP (ref 43–77)
NRBC # BLD: 0 /100 WBCS — SIGNIFICANT CHANGE UP (ref 0–0)
NRBC # BLD: 0 /100 WBCS — SIGNIFICANT CHANGE UP (ref 0–0)
PHOSPHATE SERPL-MCNC: 3.1 MG/DL — SIGNIFICANT CHANGE UP (ref 2.5–4.5)
PLATELET # BLD AUTO: 290 K/UL — SIGNIFICANT CHANGE UP (ref 150–400)
PLATELET # BLD AUTO: 290 K/UL — SIGNIFICANT CHANGE UP (ref 150–400)
POTASSIUM SERPL-MCNC: 3.7 MMOL/L — SIGNIFICANT CHANGE UP (ref 3.5–5.3)
POTASSIUM SERPL-SCNC: 3.7 MMOL/L — SIGNIFICANT CHANGE UP (ref 3.5–5.3)
PROT SERPL-MCNC: 5.9 G/DL — LOW (ref 6–8.3)
RBC # BLD: 2.75 M/UL — LOW (ref 4.2–5.8)
RBC # BLD: 3.07 M/UL — LOW (ref 4.2–5.8)
RBC # FLD: 17.5 % — HIGH (ref 10.3–14.5)
RBC # FLD: 17.9 % — HIGH (ref 10.3–14.5)
RETICS #: 22 K/UL — LOW (ref 25–125)
RETICS/RBC NFR: 0.8 % — SIGNIFICANT CHANGE UP (ref 0.5–2.5)
SODIUM SERPL-SCNC: 142 MMOL/L — SIGNIFICANT CHANGE UP (ref 135–145)
SPECIMEN SOURCE: SIGNIFICANT CHANGE UP
SPECIMEN SOURCE: SIGNIFICANT CHANGE UP
T PALLIDUM AB TITR SER: NEGATIVE — SIGNIFICANT CHANGE UP
VIT B12 SERPL-MCNC: 543 PG/ML — SIGNIFICANT CHANGE UP (ref 232–1245)
WBC # BLD: 6.6 K/UL — SIGNIFICANT CHANGE UP (ref 3.8–10.5)
WBC # BLD: 6.63 K/UL — SIGNIFICANT CHANGE UP (ref 3.8–10.5)
WBC # FLD AUTO: 6.6 K/UL — SIGNIFICANT CHANGE UP (ref 3.8–10.5)
WBC # FLD AUTO: 6.63 K/UL — SIGNIFICANT CHANGE UP (ref 3.8–10.5)

## 2020-07-15 PROCEDURE — 99233 SBSQ HOSP IP/OBS HIGH 50: CPT | Mod: GC

## 2020-07-15 PROCEDURE — 93306 TTE W/DOPPLER COMPLETE: CPT | Mod: 26

## 2020-07-15 PROCEDURE — 99223 1ST HOSP IP/OBS HIGH 75: CPT

## 2020-07-15 PROCEDURE — 76882 US LMTD JT/FCL EVL NVASC XTR: CPT | Mod: 26,RT

## 2020-07-15 RX ORDER — METHADONE HYDROCHLORIDE 40 MG/1
10 TABLET ORAL ONCE
Refills: 0 | Status: DISCONTINUED | OUTPATIENT
Start: 2020-07-15 | End: 2020-07-16

## 2020-07-15 RX ORDER — IRON SUCROSE 20 MG/ML
300 INJECTION, SOLUTION INTRAVENOUS ONCE
Refills: 0 | Status: DISCONTINUED | OUTPATIENT
Start: 2020-07-15 | End: 2020-07-15

## 2020-07-15 RX ORDER — LOPERAMIDE HCL 2 MG
2 TABLET ORAL EVERY 6 HOURS
Refills: 0 | Status: DISCONTINUED | OUTPATIENT
Start: 2020-07-15 | End: 2020-07-16

## 2020-07-15 RX ORDER — ONDANSETRON 8 MG/1
4 TABLET, FILM COATED ORAL EVERY 4 HOURS
Refills: 0 | Status: DISCONTINUED | OUTPATIENT
Start: 2020-07-15 | End: 2020-07-15

## 2020-07-15 RX ORDER — IRON SUCROSE 20 MG/ML
200 INJECTION, SOLUTION INTRAVENOUS ONCE
Refills: 0 | Status: COMPLETED | OUTPATIENT
Start: 2020-07-15 | End: 2020-07-15

## 2020-07-15 RX ORDER — ONDANSETRON 8 MG/1
8 TABLET, FILM COATED ORAL EVERY 4 HOURS
Refills: 0 | Status: DISCONTINUED | OUTPATIENT
Start: 2020-07-15 | End: 2020-07-16

## 2020-07-15 RX ORDER — DIPHENHYDRAMINE HCL 50 MG
25 CAPSULE ORAL EVERY 4 HOURS
Refills: 0 | Status: DISCONTINUED | OUTPATIENT
Start: 2020-07-15 | End: 2020-07-16

## 2020-07-15 RX ORDER — CEPHALEXIN 500 MG
500 CAPSULE ORAL
Refills: 0 | Status: DISCONTINUED | OUTPATIENT
Start: 2020-07-15 | End: 2020-07-16

## 2020-07-15 RX ORDER — IRON SUCROSE 20 MG/ML
200 INJECTION, SOLUTION INTRAVENOUS EVERY 24 HOURS
Refills: 0 | Status: DISCONTINUED | OUTPATIENT
Start: 2020-07-16 | End: 2020-07-16

## 2020-07-15 RX ADMIN — PIPERACILLIN AND TAZOBACTAM 200 GRAM(S): 4; .5 INJECTION, POWDER, LYOPHILIZED, FOR SOLUTION INTRAVENOUS at 12:58

## 2020-07-15 RX ADMIN — PIPERACILLIN AND TAZOBACTAM 200 GRAM(S): 4; .5 INJECTION, POWDER, LYOPHILIZED, FOR SOLUTION INTRAVENOUS at 05:49

## 2020-07-15 RX ADMIN — PANTOPRAZOLE SODIUM 40 MILLIGRAM(S): 20 TABLET, DELAYED RELEASE ORAL at 05:49

## 2020-07-15 RX ADMIN — Medication 500 MILLIGRAM(S): at 23:16

## 2020-07-15 RX ADMIN — Medication 500 MILLIGRAM(S): at 17:33

## 2020-07-15 RX ADMIN — Medication 166.67 MILLIGRAM(S): at 05:49

## 2020-07-15 RX ADMIN — IRON SUCROSE 110 MILLIGRAM(S): 20 INJECTION, SOLUTION INTRAVENOUS at 17:32

## 2020-07-15 NOTE — PROGRESS NOTE ADULT - PROBLEM SELECTOR PLAN 4
F: no IVF  E: K>4 Mg>2, monitor and replete as needed  N: NPO at midnight for possible scope   DVT ppx: None, currently holding in setting of low Hb with concern for possible bleed   GI ppx: Protonix PO    D: RMF    FULL CODE. Patient found to have systolic ejection murmur at bedside. PT denies known history of murmur or other cardiac conditions. Patient is currently afebrile and denies chest pain, palpitations, or shortness of breath. TTE from 7/15 showing no significant valvular disease or vegetation, making endocarditis unlikely.   -PCP follow-up   -

## 2020-07-15 NOTE — PROGRESS NOTE ADULT - PROBLEM SELECTOR PLAN 1
Pt with history of LE cellulitis, previously treated with antibiotics at outside facility, now returning with increased b/l lower extremity edema, pain, and warmth from toes to knees. Hx of multiple episodes of LE cellulitis. Doppler US LE negative for DVT. Soft tissue u/s showing edema but no abscess. Pt is s/p two doses of IV vancomycin 1250mg and four doses of IV zosyn 4.5mg, transitioned to PO Keflex 500mg q4. He has been afebrile and without leukocytosis, with other differentials for his current leg swelling including venous stasis.   - Monitor CBC/VS   -c/w Keflex On admission, ED labs showing patient anemic with Hb 6.1, currently 7.8 s/p 3 transfusions. FOBT neg, brown stool on ED rectal exam. Patient with hx of recent 4 uPRBC blood transfusion approx 1 mon prior at [facility]. Patient is severely iron deficient (current serum iron = 12). TIBC 437. . Haptoglobin 78. B12 543, folate 13.4.   -IV iron 200mg for five days per heme/onc    - trend CBC  - Maintain Hb >7  - Maintain active T/S  -f/u direct shreya   -heme/onc to follow - appreciate recommendations  -GI to follow, appreciate recommendations

## 2020-07-15 NOTE — DISCHARGE NOTE PROVIDER - CARE PROVIDER_API CALL
Dean Valero  MED - [facility] HOSPITALIST  [address] [address]  Phone: [phone]  Fax: [phone]  Follow Up Time:

## 2020-07-15 NOTE — PROGRESS NOTE ADULT - PROBLEM SELECTOR PLAN 3
On admission, ED labs showing patient anemic with Hb 6.1, currently 7.8 s/p 3 transfusions. FOBT neg, brown stool on ED rectal exam. Patient with hx of recent 4 uPRBC blood transfusion approx 1 mon prior at [facility]. Patient is severely iron deficient (current serum iron = 12). TIBC 437. . Haptoglobin 78. B12 543, folate 13.4.   -IV iron 200mg for five days per heme/onc    - trend CBC  - Maintain Hb >7  - Maintain active T/S  -f/u direct shreya   -heme/onc to follow - appreciate recommendations  -GI to follow, appreciate recommendations Patient found to have increased LFTs on admission with AST/ALT 63/66. Patient endorses alcohol use, Hep C antibody reactive and is the most likely etiology of patient's current transaminitis. Repeat LFTs down to 30s.  - Monitor LFTs.   - Patient found to have increased LFTs on admission with AST/ALT 63/66. Patient endorses alcohol use. Repeat LFTs down to 30s.  - Monitor LFTs

## 2020-07-15 NOTE — CONSULT NOTE ADULT - ASSESSMENT
36 yo M, hx of IVDU, HCV, RESHMA p/w B/L LE swelling and pain of 4-5 days duration, reports being treated for LE cellulitis in the past, labs revealed Hb 6.1, no acute source of bleed identified.   Patient reports that one month back he had massive bleeding from the left LE varicosity which lead to syncope and prompted admission to [facility], where in he received blood transfusions as well. However since then he did not notice any other episodes of bleeding varices neither does he report any rectal bleeding or dark colored stools. Denied N/V/diarrhea/abdominal pain. Reports taking cocaine, cannabis and etoh 2 days back however denies any withdrawal symptoms. Denies NSAID intake. Denied personal or FH of colon ca. Denies any known h/o hematological disorders including no Sickle cell disease. S/p 3 units PRBC with improvement in Hb to 7.8. MCV 82, low iron and ferritin. Normal liver test, albumin 3. No evidence of HF on 2D echo. U tox positive for cocaine, THC and opioids, UA psoitive for bacteria and wbc, is on keflex. Is started on IV iron    Iron deficiency anemia:  r/o occult GI bleed as possible etiology  - plan for EGD and colonoscopy on Friday, if negative will proceed with VCE  - monitor CBC, maintain Hb>7  - avoid NSAIDs  - SW evaluation for alcohol and drug rehab    HCV   normal liver test  - follow up   - [facility]  - would need outpatient follow up    Plan d/w Dr Vaibhav Estrada MD  [id number] GI fellow  pager: [phone]

## 2020-07-15 NOTE — PROGRESS NOTE ADULT - PROBLEM SELECTOR PLAN 2
Patient found to have systolic ejection murmur at bedside. PT denies known history of murmur or other cardiac conditions. Patient is currently afebrile and denies chest pain, palpitations, or shortness of breath. TTE from 7/15 showing no significant valvular disease or vegetation, making endocarditis unlikely.   -PCP follow-up   - Pt with history of LE cellulitis, previously treated with antibiotics at outside facility, now returning with increased b/l lower extremity edema, pain, and warmth from toes to knees. Hx of multiple episodes of LE cellulitis. Doppler US LE negative for DVT. Soft tissue u/s showing edema but no abscess. Pt is s/p two doses of IV vancomycin 1250mg and four doses of IV zosyn 4.5mg, transitioned to PO Keflex 500mg q4. He has been afebrile and without leukocytosis, with other differentials for his current leg swelling including venous stasis.   - Monitor CBC/VS   -c/w Keflex

## 2020-07-15 NOTE — PROGRESS NOTE ADULT - ATTENDING COMMENTS
Patient seen and examined at bedside. Agree with exam, a/p as above with the following addendum/edits:     35 year old M p/w LE swelling and tenderness and severe RESHMA. On exam, notes he got 4 u PRBC at [facility] last month but they could not figure out why he was anemic. On labs he has severe RESHMA, no signs of bleeding, GI consulted for possible scope. No signs of hemolysis, retic index is low, heme consulted. On exam, cellulitis is improved per HS, minimal tenderness and erythema, will switch to keflex for 5 day course. Patient is amenable to EGD/c-scope if offered. I have low confidence in him following up as an outpatient.

## 2020-07-15 NOTE — DISCHARGE NOTE PROVIDER - HOSPITAL COURSE
The patient was admitted on 7/14 after being found to be anemic in the ED to 6.1. While in the hospital, he was treated for cellulitis with IV antibiotics, and he received a total of three blood transfusions for his anemia. #Discharge:         Patient is 34 yo M with a past medical history of IVDU admitted on 7/14 after being found to be anemic in the ED to 6.1. While in the hospital, he was treated for cellulitis with IV antibiotics, and he received a total of three blood transfusions for his anemia with normalization of his hemoglobin. He was started on IV iron therapy due to iron deficiency and was being prepared for a colonoscopy by GI, to be performed on 7/17, but he left against medical advice on 7/16.        Problem List/Main Diagnoses (system-based): anemia, cellulitis.     Inpatient treatment course: IV and oral antibiotics    New medications: none    Labs to be followed outpatient: none     Exam to be followed outpatient: none

## 2020-07-15 NOTE — DISCHARGE NOTE PROVIDER - NSDCCPCAREPLAN_GEN_ALL_CORE_FT
PRINCIPAL DISCHARGE DIAGNOSIS  Diagnosis: Symptomatic anemia  Assessment and Plan of Treatment: IV iron, blood transfusions, was pending workup but patient left against medical advice.      SECONDARY DISCHARGE DIAGNOSES  Diagnosis: Peripheral edema  Assessment and Plan of Treatment: IV and oral antibiotics

## 2020-07-15 NOTE — PROGRESS NOTE ADULT - SUBJECTIVE AND OBJECTIVE BOX
OVERNIGHT EVENTS:  Patient received one unit of PRBC overnight with improvement of his Hgb to 6.9. He received an additional unit in the AM with improvement to 7.8. Otherwise no acute events overnight.     SUBJECTIVE / INTERVAL HPI: Patient seen and examined at bedside.   Patient was more awake and alert this AM compared to his presentation in the ED, actively participating in conversation. No new complaints, feeling well.     VITAL SIGNS:  Vital Signs Last 24 Hrs  T(C): 36.8 (15 Jul 2020 12:09), Max: 37 (2020 23:25)  T(F): 98.3 (15 Jul 2020 12:09), Max: 98.6 (2020 23:25)  HR: 63 (15 Jul 2020 12:09) (63 - 79)  BP: 126/75 (15 Jul 2020 12:09) (122/61 - 138/73)  BP(mean): --  RR: 17 (15 Jul 2020 12:09) (16 - 18)  SpO2: 100% (15 Jul 2020 12:09) (97% - 100%)    PHYSICAL EXAM:    General: Well developed, well nourished, no acute distress  HEENT: NC/AT; PERRL, anicteric sclera; MMM  Neck: supple  Cardiovascular: Systolic ejection murmur best appreciated at apex; +S1/S2, RRR, no rubs or gallops.   Respiratory: CTA B/L; no W/R/R  Gastrointestinal: soft, NT/ND; +BSx4  Extremities: Bilateral non-pitting edema from foot to knee with some warmth but no appreciable erythema. WWP; no clubbing or cyanosis  Vascular: 2+ radial pulses B/L, 1+ pedal pulses b/l  Neurological: AAOx3; no focal deficits    MEDICATIONS:  MEDICATIONS  (STANDING):  cephalexin 500 milliGRAM(s) Oral four times a day  pantoprazole    Tablet 40 milliGRAM(s) Oral before breakfast    MEDICATIONS  (PRN):  ondansetron Injectable 8 milliGRAM(s) IV Push every 4 hours PRN Nausea and/or Vomiting      ALLERGIES:  Allergies    iodine (Other)    Intolerances        LABS:                        7.8    6.63  )-----------( 290      ( 15 Jul 2020 12:24 )             25.2     07-15    142  |  107  |  9   ----------------------------<  110<H>  3.7   |  25  |  0.98    Ca    8.4      15 Jul 2020 06:22  Phos  3.1     07-15  Mg     2.0     07-15    TPro  5.9<L>  /  Alb  3.0<L>  /  TBili  0.8  /  DBili  x   /  AST  33  /  ALT  34  /  AlkPhos  65  07-15    PT/INR - ( 2020 08:25 )   PT: 12.3 sec;   INR: 1.03          PTT - ( 2020 08:25 )  PTT:27.9 sec  Urinalysis Basic - ( 2020 08:39 )    Color: Yellow / Appearance: Clear / S.020 / pH: x  Gluc: x / Ketone: NEGATIVE  / Bili: NEGATIVE / Urobili: 1.0 E.U./dL   Blood: x / Protein: NEGATIVE mg/dL / Nitrite: NEGATIVE   Leuk Esterase: Small / RBC: < 5 /HPF / WBC Many /HPF   Sq Epi: x / Non Sq Epi: x / Bacteria: Present /HPF      CAPILLARY BLOOD GLUCOSE          RADIOLOGY & ADDITIONAL TESTS: Reviewed.        PLAN: OVERNIGHT EVENTS:  Patient received one unit of PRBC overnight with improvement of his Hgb to 6.9. He received an additional unit in the AM with improvement to 7.8. Otherwise no acute events overnight.     SUBJECTIVE / INTERVAL HPI: Patient seen and examined at bedside.   Patient was more awake and alert this AM compared to his presentation in the ED, actively participating in conversation. No new complaints, feeling well.     VITAL SIGNS:  Vital Signs Last 24 Hrs  T(C): 36.8 (15 Jul 2020 12:09), Max: 37 (2020 23:25)  T(F): 98.3 (15 Jul 2020 12:09), Max: 98.6 (2020 23:25)  HR: 63 (15 Jul 2020 12:09) (63 - 79)  BP: 126/75 (15 Jul 2020 12:09) (122/61 - 138/73)  BP(mean): --  RR: 17 (15 Jul 2020 12:09) (16 - 18)  SpO2: 100% (15 Jul 2020 12:09) (97% - 100%)    PHYSICAL EXAM:    General: Well developed, well nourished, no acute distress  HEENT: NC/AT; PERRL, anicteric sclera; MMM  Neck: supple  Cardiovascular: Systolic ejection murmur best appreciated at apex; +S1/S2, RRR, no rubs or gallops.   Respiratory: CTA B/L; no W/R/R  Gastrointestinal: soft, NT/ND; +BSx4  Extremities: Bilateral non-pitting edema from foot to knee with some warmth but no appreciable erythema. WWP; no clubbing or cyanosis  Vascular: 2+ radial pulses B/L, 1+ pedal pulses b/l  Neurological: AAOx3; no focal deficits    MEDICATIONS:  MEDICATIONS  (STANDING):  cephalexin 500 milliGRAM(s) Oral four times a day  pantoprazole    Tablet 40 milliGRAM(s) Oral before breakfast    MEDICATIONS  (PRN):  ondansetron Injectable 8 milliGRAM(s) IV Push every 4 hours PRN Nausea and/or Vomiting      ALLERGIES:  Allergies    iodine (Other)    Intolerances        LABS:                        7.8    6.63  )-----------( 290      ( 15 Jul 2020 12:24 )             25.2     07-15    142  |  107  |  9   ----------------------------<  110<H>  3.7   |  25  |  0.98    Ca    8.4      15 Jul 2020 06:22  Phos  3.1     07-15  Mg     2.0     07-15    TPro  5.9<L>  /  Alb  3.0<L>  /  TBili  0.8  /  DBili  x   /  AST  33  /  ALT  34  /  AlkPhos  65  07-15    PT/INR - ( 2020 08:25 )   PT: 12.3 sec;   INR: 1.03          PTT - ( 2020 08:25 )  PTT:27.9 sec  Urinalysis Basic - ( 2020 08:39 )    Color: Yellow / Appearance: Clear / S.020 / pH: x  Gluc: x / Ketone: NEGATIVE  / Bili: NEGATIVE / Urobili: 1.0 E.U./dL   Blood: x / Protein: NEGATIVE mg/dL / Nitrite: NEGATIVE   Leuk Esterase: Small / RBC: < 5 /HPF / WBC Many /HPF   Sq Epi: x / Non Sq Epi: x / Bacteria: Present /HPF      CAPILLARY BLOOD GLUCOSE          RADIOLOGY & ADDITIONAL TESTS: Reviewed.    < from: Xray Chest 1 View AP/PA (20 @ 07:44) >  IMPRESSION:    Support Devices: None.  Lungs/Airways: No airspace consolidation. No pleural effusion. No pneumothorax.  Cardiomediastinal: Cardiomegaly.  Bones and soft tissues: Within normal limits.    < end of copied text >  < from: US Extremity Nonvasc Limited, Bilateral (07.15.20 @ 11:00) >  IMPRESSION:     There is extensive subcutaneous edema and bilateral calf soft tissues. No discrete collection to suggest abscess.

## 2020-07-15 NOTE — PROGRESS NOTE ADULT - PROBLEM SELECTOR PLAN 6
F: no IVF  E: K>4 Mg>2, monitor and replete as needed  N: NPO at midnight for possible scope   DVT ppx: None, currently holding in setting of low Hb with concern for possible bleed   GI ppx: Protonix PO    D: RMF    FULL CODE.

## 2020-07-16 VITALS
TEMPERATURE: 99 F | HEART RATE: 66 BPM | RESPIRATION RATE: 17 BRPM | DIASTOLIC BLOOD PRESSURE: 78 MMHG | SYSTOLIC BLOOD PRESSURE: 137 MMHG | OXYGEN SATURATION: 96 %

## 2020-07-16 DIAGNOSIS — B19.20 UNSPECIFIED VIRAL HEPATITIS C WITHOUT HEPATIC COMA: ICD-10-CM

## 2020-07-16 DIAGNOSIS — R60.9 EDEMA, UNSPECIFIED: ICD-10-CM

## 2020-07-16 DIAGNOSIS — F19.10 OTHER PSYCHOACTIVE SUBSTANCE ABUSE, UNCOMPLICATED: ICD-10-CM

## 2020-07-16 LAB
ANION GAP SERPL CALC-SCNC: 8 MMOL/L — SIGNIFICANT CHANGE UP (ref 5–17)
BUN SERPL-MCNC: 7 MG/DL — SIGNIFICANT CHANGE UP (ref 7–23)
CALCIUM SERPL-MCNC: 8.7 MG/DL — SIGNIFICANT CHANGE UP (ref 8.4–10.5)
CHLORIDE SERPL-SCNC: 106 MMOL/L — SIGNIFICANT CHANGE UP (ref 96–108)
CO2 SERPL-SCNC: 26 MMOL/L — SIGNIFICANT CHANGE UP (ref 22–31)
CREAT SERPL-MCNC: 0.85 MG/DL — SIGNIFICANT CHANGE UP (ref 0.5–1.3)
FOLATE SERPL-MCNC: 5.4 NG/ML — SIGNIFICANT CHANGE UP
GLUCOSE SERPL-MCNC: 115 MG/DL — HIGH (ref 70–99)
HCT VFR BLD CALC: 26.6 % — LOW (ref 39–50)
HCV RNA SERPL NAA DL=5-ACNC: HIGH IU/ML
HCV RNA SPEC NAA+PROBE-LOG IU: 4.13 LOG10IU/ML — HIGH
HGB BLD-MCNC: 8.1 G/DL — LOW (ref 13–17)
MCHC RBC-ENTMCNC: 24.8 PG — LOW (ref 27–34)
MCHC RBC-ENTMCNC: 30.5 GM/DL — LOW (ref 32–36)
MCV RBC AUTO: 81.6 FL — SIGNIFICANT CHANGE UP (ref 80–100)
NRBC # BLD: 0 /100 WBCS — SIGNIFICANT CHANGE UP (ref 0–0)
PLATELET # BLD AUTO: 303 K/UL — SIGNIFICANT CHANGE UP (ref 150–400)
POTASSIUM SERPL-MCNC: 3.7 MMOL/L — SIGNIFICANT CHANGE UP (ref 3.5–5.3)
POTASSIUM SERPL-SCNC: 3.7 MMOL/L — SIGNIFICANT CHANGE UP (ref 3.5–5.3)
RBC # BLD: 3.26 M/UL — LOW (ref 4.2–5.8)
RBC # FLD: 17.9 % — HIGH (ref 10.3–14.5)
SODIUM SERPL-SCNC: 140 MMOL/L — SIGNIFICANT CHANGE UP (ref 135–145)
VANCOMYCIN TROUGH SERPL-MCNC: 4.6 UG/ML — LOW (ref 10–20)
VIT B12 SERPL-MCNC: 456 PG/ML — SIGNIFICANT CHANGE UP (ref 232–1245)
WBC # BLD: 6.87 K/UL — SIGNIFICANT CHANGE UP (ref 3.8–10.5)
WBC # FLD AUTO: 6.87 K/UL — SIGNIFICANT CHANGE UP (ref 3.8–10.5)

## 2020-07-16 PROCEDURE — 82728 ASSAY OF FERRITIN: CPT

## 2020-07-16 PROCEDURE — 36430 TRANSFUSION BLD/BLD COMPNT: CPT

## 2020-07-16 PROCEDURE — 80048 BASIC METABOLIC PNL TOTAL CA: CPT

## 2020-07-16 PROCEDURE — 80076 HEPATIC FUNCTION PANEL: CPT

## 2020-07-16 PROCEDURE — 96374 THER/PROPH/DIAG INJ IV PUSH: CPT

## 2020-07-16 PROCEDURE — 84466 ASSAY OF TRANSFERRIN: CPT

## 2020-07-16 PROCEDURE — 87491 CHLMYD TRACH DNA AMP PROBE: CPT

## 2020-07-16 PROCEDURE — C9113: CPT

## 2020-07-16 PROCEDURE — 93970 EXTREMITY STUDY: CPT

## 2020-07-16 PROCEDURE — 85025 COMPLETE CBC W/AUTO DIFF WBC: CPT

## 2020-07-16 PROCEDURE — 87086 URINE CULTURE/COLONY COUNT: CPT

## 2020-07-16 PROCEDURE — 86850 RBC ANTIBODY SCREEN: CPT

## 2020-07-16 PROCEDURE — 85379 FIBRIN DEGRADATION QUANT: CPT

## 2020-07-16 PROCEDURE — 84100 ASSAY OF PHOSPHORUS: CPT

## 2020-07-16 PROCEDURE — 86703 HIV-1/HIV-2 1 RESULT ANTBDY: CPT

## 2020-07-16 PROCEDURE — 71045 X-RAY EXAM CHEST 1 VIEW: CPT

## 2020-07-16 PROCEDURE — 85045 AUTOMATED RETICULOCYTE COUNT: CPT

## 2020-07-16 PROCEDURE — 80053 COMPREHEN METABOLIC PANEL: CPT

## 2020-07-16 PROCEDURE — 86780 TREPONEMA PALLIDUM: CPT

## 2020-07-16 PROCEDURE — 87591 N.GONORRHOEAE DNA AMP PROB: CPT

## 2020-07-16 PROCEDURE — 82272 OCCULT BLD FECES 1-3 TESTS: CPT

## 2020-07-16 PROCEDURE — 86140 C-REACTIVE PROTEIN: CPT

## 2020-07-16 PROCEDURE — U0003: CPT

## 2020-07-16 PROCEDURE — 83615 LACTATE (LD) (LDH) ENZYME: CPT

## 2020-07-16 PROCEDURE — 99239 HOSP IP/OBS DSCHRG MGMT >30: CPT | Mod: GC

## 2020-07-16 PROCEDURE — P9016: CPT

## 2020-07-16 PROCEDURE — 81001 URINALYSIS AUTO W/SCOPE: CPT

## 2020-07-16 PROCEDURE — 85610 PROTHROMBIN TIME: CPT

## 2020-07-16 PROCEDURE — 84484 ASSAY OF TROPONIN QUANT: CPT

## 2020-07-16 PROCEDURE — 86880 COOMBS TEST DIRECT: CPT

## 2020-07-16 PROCEDURE — 86803 HEPATITIS C AB TEST: CPT

## 2020-07-16 PROCEDURE — 96372 THER/PROPH/DIAG INJ SC/IM: CPT | Mod: XU

## 2020-07-16 PROCEDURE — 83550 IRON BINDING TEST: CPT

## 2020-07-16 PROCEDURE — 86923 COMPATIBILITY TEST ELECTRIC: CPT

## 2020-07-16 PROCEDURE — 83880 ASSAY OF NATRIURETIC PEPTIDE: CPT

## 2020-07-16 PROCEDURE — 85027 COMPLETE CBC AUTOMATED: CPT

## 2020-07-16 PROCEDURE — 76882 US LMTD JT/FCL EVL NVASC XTR: CPT

## 2020-07-16 PROCEDURE — 83735 ASSAY OF MAGNESIUM: CPT

## 2020-07-16 PROCEDURE — 83010 ASSAY OF HAPTOGLOBIN QUANT: CPT

## 2020-07-16 PROCEDURE — 80307 DRUG TEST PRSMV CHEM ANLYZR: CPT

## 2020-07-16 PROCEDURE — 87521 HEPATITIS C PROBE&RVRS TRNSC: CPT

## 2020-07-16 PROCEDURE — 82746 ASSAY OF FOLIC ACID SERUM: CPT

## 2020-07-16 PROCEDURE — 82248 BILIRUBIN DIRECT: CPT

## 2020-07-16 PROCEDURE — 36415 COLL VENOUS BLD VENIPUNCTURE: CPT

## 2020-07-16 PROCEDURE — 83540 ASSAY OF IRON: CPT

## 2020-07-16 PROCEDURE — 99232 SBSQ HOSP IP/OBS MODERATE 35: CPT

## 2020-07-16 PROCEDURE — 85730 THROMBOPLASTIN TIME PARTIAL: CPT

## 2020-07-16 PROCEDURE — 99285 EMERGENCY DEPT VISIT HI MDM: CPT | Mod: 25

## 2020-07-16 PROCEDURE — C8929: CPT

## 2020-07-16 PROCEDURE — 86901 BLOOD TYPING SEROLOGIC RH(D): CPT

## 2020-07-16 PROCEDURE — 82607 VITAMIN B-12: CPT

## 2020-07-16 PROCEDURE — 82247 BILIRUBIN TOTAL: CPT

## 2020-07-16 PROCEDURE — 80202 ASSAY OF VANCOMYCIN: CPT

## 2020-07-16 RX ORDER — HEPARIN SODIUM 5000 [USP'U]/ML
5000 INJECTION INTRAVENOUS; SUBCUTANEOUS EVERY 8 HOURS
Refills: 0 | Status: DISCONTINUED | OUTPATIENT
Start: 2020-07-16 | End: 2020-07-16

## 2020-07-16 RX ORDER — ENOXAPARIN SODIUM 100 MG/ML
40 INJECTION SUBCUTANEOUS DAILY
Refills: 0 | Status: DISCONTINUED | OUTPATIENT
Start: 2020-07-16 | End: 2020-07-16

## 2020-07-16 RX ORDER — SOD SULF/SODIUM/NAHCO3/KCL/PEG
2000 SOLUTION, RECONSTITUTED, ORAL ORAL ONCE
Refills: 0 | Status: DISCONTINUED | OUTPATIENT
Start: 2020-07-16 | End: 2020-07-16

## 2020-07-16 RX ORDER — FOLIC ACID 0.8 MG
1 TABLET ORAL DAILY
Refills: 0 | Status: DISCONTINUED | OUTPATIENT
Start: 2020-07-16 | End: 2020-07-16

## 2020-07-16 RX ORDER — SOD SULF/SODIUM/NAHCO3/KCL/PEG
2000 SOLUTION, RECONSTITUTED, ORAL ORAL ONCE
Refills: 0 | Status: DISCONTINUED | OUTPATIENT
Start: 2020-07-17 | End: 2020-07-16

## 2020-07-16 RX ADMIN — IRON SUCROSE 110 MILLIGRAM(S): 20 INJECTION, SOLUTION INTRAVENOUS at 10:28

## 2020-07-16 RX ADMIN — Medication 500 MILLIGRAM(S): at 11:25

## 2020-07-16 RX ADMIN — Medication 500 MILLIGRAM(S): at 05:50

## 2020-07-16 RX ADMIN — PANTOPRAZOLE SODIUM 40 MILLIGRAM(S): 20 TABLET, DELAYED RELEASE ORAL at 05:51

## 2020-07-16 RX ADMIN — HEPARIN SODIUM 5000 UNIT(S): 5000 INJECTION INTRAVENOUS; SUBCUTANEOUS at 13:29

## 2020-07-16 NOTE — PROGRESS NOTE ADULT - SUBJECTIVE AND OBJECTIVE BOX
Patient is a 35y old  Male who presents with a chief complaint of Anemia, cellulitis (16 Jul 2020 12:40)      INTERVAL HPI/OVERNIGHT EVENTS:    Pt. seen and examined earlier today  Pt. feels well -- denies CP, SOB, lightheadedness, fatigue, bleeding sx  Pt. reports his legs are much less swollen    Review of Systems: 12 point review of systems otherwise negative    MEDICATIONS  (STANDING):  cephalexin 500 milliGRAM(s) Oral four times a day  folic acid 1 milliGRAM(s) Oral daily  heparin   Injectable 5000 Unit(s) SubCutaneous every 8 hours  iron sucrose IVPB 200 milliGRAM(s) IV Intermittent every 24 hours  pantoprazole    Tablet 40 milliGRAM(s) Oral before breakfast  polyethylene glycol/electrolyte Solution 2000 milliLiter(s) Oral once    MEDICATIONS  (PRN):  diphenhydrAMINE 25 milliGRAM(s) Oral every 4 hours PRN Rash and/or Itching  loperamide 2 milliGRAM(s) Oral every 6 hours PRN Diarrhea  ondansetron Injectable 8 milliGRAM(s) IV Push every 4 hours PRN Nausea and/or Vomiting      Allergies    iodine (Other)    Intolerances          Vital Signs Last 24 Hrs  T(C): 37.1 (16 Jul 2020 13:47), Max: 37.1 (15 Jul 2020 21:00)  T(F): 98.8 (16 Jul 2020 13:47), Max: 98.8 (16 Jul 2020 13:47)  HR: 66 (16 Jul 2020 13:47) (66 - 68)  BP: 137/78 (16 Jul 2020 13:47) (137/78 - 149/81)  BP(mean): --  RR: 17 (16 Jul 2020 13:47) (16 - 18)  SpO2: 96% (16 Jul 2020 13:47) (96% - 100%)  CAPILLARY BLOOD GLUCOSE          07-15 @ 07:01  -  07-16 @ 07:00  --------------------------------------------------------  IN: 0 mL / OUT: 350 mL / NET: -350 mL        Physical Exam:  (earlier today)  Daily     Daily   General: well-appearing in NAD  HEENT:  no pallor, anicteric  CV: no JVD  Resp: normal WOB on RA  Extremities:  B/L LE chronic venous stasis skin changes, less edema (per Pt.)  Skin:  WWP  Neuro:  AAOx4    LABS:                        8.1    6.87  )-----------( 303      ( 16 Jul 2020 11:10 )             26.6     07-16    140  |  106  |  7   ----------------------------<  115<H>  3.7   |  26  |  0.85    Ca    8.7      16 Jul 2020 11:10  Phos  3.1     07-15  Mg     2.0     07-15    TPro  x   /  Alb  x   /  TBili  0.8  /  DBili  <0.2  /  AST  x   /  ALT  x   /  AlkPhos  x   07-15            RADIOLOGY & ADDITIONAL TESTS:    ---------------------------------------------------------------------------  I personally reviewed: [  ]EKG   [  ]CXR    [  ] CT    [  ]Other  ---------------------------------------------------------------------------  PLEASE CHECK WHEN PRESENT:     [  ]Heart Failure     [  ] Acute     [  ] Acute on Chronic     [  ] Chronic  -------------------------------------------------------------------     [  ]Diastolic [HFpEF]     [  ]Systolic [HFrEF]     [  ]Combined [HFpEF & HFrEF]     [  ]Other:  -------------------------------------------------------------------  [  ]TRICIA     [  ]ATN     [  ]Reneal Medullary Necrosis     [  ]Renal Cortical Necrosis     [  ]Other Pathological Lesions:    [  ]CKD 1  [  ]CKD 2  [  ]CKD 3  [  ]CKD 4  [  ]CKD 5  [  ]Other  -------------------------------------------------------------------  [  ]Other/Unspecified:    --------------------------------------------------------------------    Abdominal Nutritional Status  [  ]Malnutrition: See Nutrition Note  [  ]Cachexia  [  ]Other:   [  ]Supplement Ordered:  [  ]Morbid Obesity (BMI >=40]

## 2020-07-16 NOTE — PROGRESS NOTE ADULT - PROBLEM SELECTOR PLAN 2
improved; likely d/t chronic venous insufficiency, based on exam; TTE w/out e/o CHF or pHTN; Dopplers negative for DVT, U/S negative for abscess; cont. leg elevation

## 2020-07-16 NOTE — PROGRESS NOTE ADULT - PROBLEM SELECTOR PLAN 1
Patient admitted with a Hgb of 6.1. Currently 7.8 s/p 3 units of PRBC. He was severely iron deficient on admission (12), with normal B12, folate, and haptoglobin. .   -plan for EDG and colonoscopy by GI on Friday 7/17   -started on IV iron 200mg daily for 5 days   -to be evaluated by heme/onc   -trend CBC

## 2020-07-16 NOTE — CHART NOTE - NSCHARTNOTEFT_GEN_A_CORE
The patient wishes to leave against medical advice.  I have discussed the risks, benefits and alternatives (including the possibility of worsening of disease, pain, permanent disability, and/or death) with the patient and his/her family (if available).  The patient voices understanding of these risks, benefits, and alternatives and still wishes to sign out against medical advice.  The patient is awake, alert, oriented  x 3 and has demonstrated capacity to refuse/direct care.  I have advised the patient that they can and should return immediately should they develop any worse/different/additional symptoms, or if they change their mind and want to continue their care. Discussed with pt need to monitor EJ removal site to ensure there no complications of bleeding, pt decided to leave AMA knowing risks of further bleeding, infection, and thrombosis.

## 2020-07-16 NOTE — CONSULT NOTE ADULT - ASSESSMENT
-Venofer 200 mg IV QD x 5 doses  -folic acid 1 mg QD  -check hepatitis panel and HIV  -evaluation for bleeding   -GI eval for EGD and/or C-scope given RESHMA in this young male 36 yo M, undomiciled, poor historian, hx of IVDU presenting to [facility] with B/L LE swelling and pain of 4-5 days duration. He states that for the past 4-5 days or so, he has experienced increased swelling and pain of his bilateral lower extremities from the toes up to the knees but has been able to continue ambulating. He endorses recent subjective fevers and chills. He denies chest pain, cough, nausea/vomiting, dysuria, changes in BM. Patient reports being treated multiple times in the past for LE cellulitis.     Anemia - RESHMA given iron panel. Needs work up for bleeding.  -Venofer 200 mg IV QD x 5 doses  -folic acid 1 mg QD  -check hepatitis panel and HIV  -evaluation for bleeding   -GI eval for EGD and/or C-scope given RESHMA in this young male    Discussed with primary team.      Discussed with .

## 2020-07-16 NOTE — PROGRESS NOTE ADULT - ATTENDING COMMENTS
Dispo: Pt. decided to leave AMA, as he is feeling better and his Hb improved; Pt. A&Ox4; he verbalized understanding of benefits of staying and risks of leaving; Pt. verbalized plan to f/u w/ an outpatient provider for CBC monitoring, possible outpatient EGD and c-scope  Pt. pulled out his own EJ, no e/o bleeding, Pt. left the floor before he could be given d/c paperwork; RN and RN manager aware

## 2020-07-16 NOTE — PROGRESS NOTE ADULT - PROBLEM SELECTOR PLAN 4
Patient with newly noted systolic ejection murmur on exam. He denies chest pain, shortness of breath, palpitations, or fever/chills. TTE negative for vegetations or valvular disease.   -follow-up outpatient

## 2020-07-16 NOTE — PROGRESS NOTE ADULT - SUBJECTIVE AND OBJECTIVE BOX
OVERNIGHT EVENTS:  No acute events overnight.     SUBJECTIVE / INTERVAL HPI: Patient seen and examined at bedside.   This morning, the patient reports some pain in his left leg but is otherwise feeling well. He notes that he is not experiencing any withdrawal symptoms at this time.     VITAL SIGNS:  Vital Signs Last 24 Hrs  T(C): 36.9 (2020 05:23), Max: 37.1 (15 Jul 2020 21:00)  T(F): 98.4 (2020 05:23), Max: 98.7 (15 Jul 2020 21:00)  HR: 66 (2020 05:23) (63 - 79)  BP: 149/81 (2020 05:23) (126/75 - 149/81)  BP(mean): --  RR: 18 (2020 05:23) (16 - 18)  SpO2: 99% (2020 05:23) (99% - 100%)    PHYSICAL EXAM:    General: Well developed, well nourished, no acute distress  HEENT: NC/AT; PERRL, anicteric sclera; MMM  Neck: supple  Cardiovascular: Systolic ejection murmur heard best at apex. +S1/S2, RRR, no rubs, gallops  Respiratory: CTA B/L; no W/R/R  Gastrointestinal: soft, NT/ND; +BSx4  Extremities: Bilateral lower extremities edematous to knees, somewhat improved since yesterday. Chronic venous stasis changes bilaterally.   Vascular: 2+ radial pulses B/L. +1 pedal pulses.   Neurological: AAOx3; no focal deficits    MEDICATIONS:  MEDICATIONS  (STANDING):  cephalexin 500 milliGRAM(s) Oral four times a day  enoxaparin Injectable 40 milliGRAM(s) SubCutaneous daily  iron sucrose IVPB 200 milliGRAM(s) IV Intermittent every 24 hours  pantoprazole    Tablet 40 milliGRAM(s) Oral before breakfast    MEDICATIONS  (PRN):  diphenhydrAMINE 25 milliGRAM(s) Oral every 4 hours PRN Rash and/or Itching  loperamide 2 milliGRAM(s) Oral every 6 hours PRN Diarrhea  ondansetron Injectable 8 milliGRAM(s) IV Push every 4 hours PRN Nausea and/or Vomiting      ALLERGIES:  Allergies    iodine (Other)    LABS:                        7.8    6.63  )-----------( 290      ( 15 Jul 2020 12:24 )             25.2     07-15    142  |  107  |  9   ----------------------------<  110<H>  3.7   |  25  |  0.98    Ca    8.4      15 Jul 2020 06:22  Phos  3.1     07-15  Mg     2.0     07-15    TPro  x   /  Alb  x   /  TBili  0.8  /  DBili  <0.2  /  AST  x   /  ALT  x   /  AlkPhos  x   07-15    PT/INR - ( 2020 08:25 )   PT: 12.3 sec;   INR: 1.03          PTT - ( 2020 08:25 )  PTT:27.9 sec  Urinalysis Basic - ( 2020 08:39 )    Color: Yellow / Appearance: Clear / S.020 / pH: x  Gluc: x / Ketone: NEGATIVE  / Bili: NEGATIVE / Urobili: 1.0 E.U./dL   Blood: x / Protein: NEGATIVE mg/dL / Nitrite: NEGATIVE   Leuk Esterase: Small / RBC: < 5 /HPF / WBC Many /HPF   Sq Epi: x / Non Sq Epi: x / Bacteria: Present /HPF    B12: 456  Folate: 5.4  Bili, total: 0.8  LDH: 281  Haptoglobin: 78    RADIOLOGY & ADDITIONAL TESTS: Reviewed.  < from: TTE Echo Complete w/ Contrast w/ Doppler (07.15.20 @ 10:05) >  CONCLUSIONS:     1. The left ventricle is normal in size, wall thickness, and systolic function with a calculated ejection fraction of 55-60%. There are no regional wall motion abnormalities seen. There is normal left ventricular diastolic function and filling pressure.   2. The right ventricle is normal in size. Right ventricular systolic function is normal.   3. The left atrium is mildly dilated.   4. No significant valvular disease.   5. There is trace mitralregurgitation.   6. There is trace tricuspid regurgitation.   7. No evidence of pulmonary hypertension.   8. No pericardial effusion.   9. The aortic root is normal in size.  10. No valvular vegetations seen on this study.

## 2020-07-16 NOTE — PROGRESS NOTE ADULT - PROBLEM SELECTOR PLAN 1
unclear etiology; Hb improved and sx resolved s/p PRBC transfusions; cont. iron per Heme recs; GI recommended EGD and c-scope, but Pt. refusing to complete inpatient -- Pt. verbalized understanding of risks of refusal

## 2020-07-16 NOTE — PROGRESS NOTE ADULT - ASSESSMENT
Patient is a 35-year-old M, undomiciled, with a PMH of IVDU who presented with bilateral lower extremity swelling and pain for 4-5 days, found to be anemic to 6.1 on admission, admitted for anemia and cellulitis treatment.
34 yo M, hx of IVDU, HCV, RESHMA p/w B/L LE swelling and pain of 4-5 days duration, reports being treated for LE cellulitis in the past, labs revealed Hb 6.1, no acute source of bleed identified.   Patient reports that one month back he had massive bleeding from the left LE varicosity which lead to syncope and prompted admission to [facility], where in he received blood transfusions as well. However since then he did not notice any other episodes of bleeding varices neither does he report any rectal bleeding or dark colored stools. Denied N/V/diarrhea/abdominal pain. Reports taking cocaine, cannabis and etoh 2 days back however denies any withdrawal symptoms. Denies NSAID intake. Denied personal or FH of colon ca. Denies any known h/o hematological disorders including no Sickle cell disease. S/p 3 units PRBC with improvement in Hb to 7.8. MCV 82, low iron and ferritin. Normal liver test, albumin 3. No evidence of HF on 2D echo. U tox positive for cocaine, THC and opioids, UA psoitive for bacteria and wbc, is on keflex. Is started on IV iron.    Hb stable at 8.1. No new complaints    Iron deficiency anemia:  r/o occult GI bleed as possible etiology  - plan for EGD and colonoscopy tomorrow if negative will proceed with VCE  - Please order split-dose prep 4L GoLytely give with 2L given at 17:00 today and an additional 2L at 05:00 tomorrow  - c/w CLD diet for now. NPO at [facility] except medication for colonoscopy in AM  - monitor CBC, maintain Hb>7  - avoid NSAIDs  -  evaluation for alcohol and drug rehab    HCV   normal liver test  - follow up   - [facility]  - would need outpatient follow up    Plan d/w Dr Vaibhav Estrada MD  [id number] GI fellow  pager: [phone]
36 y/o M w/
Patient is a 35-year-old M, undomiciled, with a PMH of IVDU who presented to the ED with bilateral lower extremity swelling and was found to be anemic to 6.1, admitted for cellulitis treatment and anemia workup.

## 2020-07-16 NOTE — PROGRESS NOTE ADULT - PROBLEM SELECTOR PLAN 2
Patient initially presented with bilateral lower extremity swelling and pain. US negative for DVT and abscess. Patient has been afebrile since admission.   -c/w Keflex 500mg PO q6

## 2020-07-16 NOTE — CONSULT NOTE ADULT - SUBJECTIVE AND OBJECTIVE BOX
GASTROENTEROLOGY CONSULT NOTE  HPI:  34 yo M, undomiciled, poor historian, hx of IVDU presenting to [facility] with B/L LE swelling and pain of 4-5 days duration. He states that for the past 4-5 days or so, he has experienced increased swelling and pain of his bilateral lower extremities from the toes up to the knees but has been able to continue ambulating. He endorses recent subjective fevers and chills. He denies chest pain, cough, nausea/vomiting, dysuria, changes in BM. Patient reports being treated multiple times in the past for LE cellulitis. Upon presentation to the ED, patient was also found to have dyspnea on exertion with initial labs revealing Hb 6.1, no acute source of bleed identified. Per chart review, patient was seen approx 1 mon at [facility] and transfused 4 U pRBC, no source of bleed identified. Patient currently denies hematuria, hematemesis, nose bleeds, hematochezia, melena, or any other sources of bleeding.     Patient is a poor historian, reports that one month back he had massive bleeding from the left LE varicosity which lead to syncope and prompted admission to [facility], where in he received blood transfusions as well. However since then he did not notice any other episodes of bleeding varices neither does he report any rectal bleeding or dark colored stools. Denied N/V/diarrhea/abdominal pain. Reports taking cocaine, cannabis and etoh 2 days back however denies any withdrawal symptoms. Denies NSAID intake. Denied personal or FH of colon ca. Denies any known h/o hematological disorders including no Sickle cell disease.    In the ED,  VS: T 98F, HR 88, /62, RR 18, SpO2 100%  Labs: Hb 6.1, Plt 99, D-dimer 237, Alb 3.0, CRP 5.5,   UTox: Positive for THC, cocaine, opiates   Imaging: Doppler LE (7/14) negative for DVT.   Orders: Azithro 1g PO, CTX 250mg IM, Doxy 100mg PO, Lasix 40mg, Protonix 40mg IV (14 Jul 2020 12:16)    Allergies    iodine (Other)    Intolerances      Home Medications:    MEDICATIONS:  MEDICATIONS  (STANDING):  cephalexin 500 milliGRAM(s) Oral four times a day  iron sucrose IVPB 200 milliGRAM(s) IV Intermittent once  pantoprazole    Tablet 40 milliGRAM(s) Oral before breakfast    MEDICATIONS  (PRN):  diphenhydrAMINE 25 milliGRAM(s) Oral every 4 hours PRN Rash and/or Itching  loperamide 2 milliGRAM(s) Oral every 6 hours PRN Diarrhea  methadone    Tablet 10 milliGRAM(s) Oral once PRN opioid withdrawal  ondansetron Injectable 8 milliGRAM(s) IV Push every 4 hours PRN Nausea and/or Vomiting    PAST MEDICAL & SURGICAL HISTORY:  Peripheral edema  Varicose veins  Cellulitis  H/O arthroscopy of shoulder    FAMILY HISTORY:    SOCIAL HISTORY:  [place]: [ ] Current, [ ] Former, [ ] Never; Pack Years:  Alcohol:  Illicit Drugs:    REVIEW OF SYSTEMS:  CONSTITUTIONAL: No weakness, fevers or chills  HEENT: No visual changes; No vertigo or throat pain   NECK: No pain or stiffness  RESPIRATORY: No cough, wheezing, hemoptysis; No shortness of breath  CARDIOVASCULAR: No chest pain or palpitations  GASTROINTESTINAL: No abdominal or epigastric pain. No nausea, vomiting, or hematemesis; No diarrhea or constipation. No melena or hematochezia.  GENITOURINARY: No dysuria, frequency or hematuria  NEUROLOGICAL: No numbness or weakness  SKIN: healed scar over the left leg. b/l LE swelling with chronic skin changes  All other 10 review of systems is negative unless indicated above.    Vital Signs Last 24 Hrs  T(C): 36.8 (15 Jul 2020 12:09), Max: 37 (14 Jul 2020 23:25)  T(F): 98.3 (15 Jul 2020 12:09), Max: 98.6 (14 Jul 2020 23:25)  HR: 63 (15 Jul 2020 12:09) (63 - 79)  BP: 126/75 (15 Jul 2020 12:09) (122/61 - 138/73)  BP(mean): --  RR: 17 (15 Jul 2020 12:09) (16 - 18)  SpO2: 100% (15 Jul 2020 12:09) (97% - 100%)    07-14 @ 07:01  -  07-15 @ 07:00  --------------------------------------------------------  IN: 650 mL / OUT: 1800 mL / NET: -1150 mL    07-15 @ 07:01  -  07-15 @ 17:28  --------------------------------------------------------  IN: 0 mL / OUT: 350 mL / NET: -350 mL        PHYSICAL EXAM:    General: Well developed; well nourished; in no acute distress  Eyes: Anicteric sclerae, moist conjunctivae  HENT: Moist mucous membranes  Neck: Trachea midline, supple  Lungs: Normal respiratory effort, no intercostal retractions  Cardiovascular: RRR  Abdomen: Soft, non-tender non-distended; Normal bowel sounds; No rebound or guarding  Extremities: Normal range of motion, No clubbing, cyanosis or edema  Neurological: Alert and oriented x3  Skin: Warm and dry. No obvious rash    LABS:                        7.8    6.63  )-----------( 290      ( 15 Jul 2020 12:24 )             25.2     07-15    142  |  107  |  9   ----------------------------<  110<H>  3.7   |  25  |  0.98    Ca    8.4      15 Jul 2020 06:22  Phos  3.1     07-15  Mg     2.0     07-15    TPro  x   /  Alb  x   /  TBili  0.8  /  DBili  <0.2  /  AST  x   /  ALT  x   /  AlkPhos  x   07-15        PT/INR - ( 14 Jul 2020 08:25 )   PT: 12.3 sec;   INR: 1.03          PTT - ( 14 Jul 2020 08:25 )  PTT:27.9 sec    Culture - Urine (collected 14 Jul 2020 11:59)  Source: .Urine Clean Catch (Midstream)  Final Report (15 Jul 2020 11:13):    No growth      RADIOLOGY & ADDITIONAL STUDIES:     Reviewed
HEMATOLOGY/ONCOLOGY CONSULT NOTE  36 yo M, undomiciled, poor historian, hx of IVDU presenting to [facility] with B/L LE swelling and pain of 4-5 days duration. He states that for the past 4-5 days or so, he has experienced increased swelling and pain of his bilateral lower extremities from the toes up to the knees but has been able to continue ambulating. He endorses recent subjective fevers and chills. He denies chest pain, cough, nausea/vomiting, dysuria, changes in BM. Patient reports being treated multiple times in the past for LE cellulitis.     Upon presentation to the ED, patient was also found to have dyspnea on exertion with initial labs revealing Hb 6.1, no acute source of bleed identified. Per chart review, patient was seen approx 1 mon at [facility] and transfused 4 U pRBC, no source of bleed identified. Patient currently denies hematuria, hematemesis, nose bleeds, hematochezia, melena, or any other sources of bleeding.     In the ED,  VS: T 98F, HR 88, /62, RR 18, SpO2 100%  Labs: Hb 6.1, Plt 99, D-dimer 237, Alb 3.0, CRP 5.5,   UTox: Positive for THC, cocaine, opiates   Imaging: Doppler LE (7/14) negative for DVT.   Orders: Azithro 1g PO, CTX 250mg IM, Doxy 100mg PO, Lasix 40mg, Protonix 40mg IV (14 Jul 2020 12:16)          Allergies    iodine (Other)    Intolerances      REVIEW OF SYSTEMS:    CONSTITUTIONAL: No fever, weight loss, or fatigue  EYES: No eye pain, visual disturbances, or discharge  ENMT:  No difficulty hearing, tinnitus, vertigo; No sinus or throat pain  NECK: No pain or stiffness  BREASTS: No pain, masses, or nipple discharge  RESPIRATORY: No cough, wheezing, chills or hemoptysis; No shortness of breath  CARDIOVASCULAR: No chest pain, palpitations, dizziness, or leg swelling  GASTROINTESTINAL: No abdominal or epigastric pain. No nausea, vomiting, or hematemesis; No diarrhea or constipation. No melena or hematochezia.  GENITOURINARY: No dysuria, frequency, hematuria, or incontinence  NEUROLOGICAL: No headaches, memory loss, loss of strength, numbness, or tremors  SKIN: No itching, burning, rashes, or lesions   LYMPH NODES: No enlarged glands  ENDOCRINE: No heat or cold intolerance; No hair loss  MUSCULOSKELETAL: No joint pain or swelling; No muscle, back, or extremity pain  PSYCHIATRIC: No depression, anxiety, mood swings, or difficulty sleeping  HEME/LYMPH: No easy bruising, or bleeding gums  ALLERY AND IMMUNOLOGIC: No hives or eczema      MEDICATIONS  (STANDING):  cephalexin 500 milliGRAM(s) Oral four times a day  heparin   Injectable 5000 Unit(s) SubCutaneous every 8 hours  iron sucrose IVPB 200 milliGRAM(s) IV Intermittent every 24 hours  pantoprazole    Tablet 40 milliGRAM(s) Oral before breakfast  polyethylene glycol/electrolyte Solution 2000 milliLiter(s) Oral once    MEDICATIONS  (PRN):  diphenhydrAMINE 25 milliGRAM(s) Oral every 4 hours PRN Rash and/or Itching  loperamide 2 milliGRAM(s) Oral every 6 hours PRN Diarrhea  ondansetron Injectable 8 milliGRAM(s) IV Push every 4 hours PRN Nausea and/or Vomiting    Vital Signs Last 24 Hrs  T(C): 37.1 (07-16-20 @ 13:47), Max: 37.1 (07-15-20 @ 21:00)  T(F): 98.8 (07-16-20 @ 13:47), Max: 98.8 (07-16-20 @ 13:47)  HR: 66 (07-16-20 @ 13:47) (66 - 68)  BP: 137/78 (07-16-20 @ 13:47) (137/78 - 149/81)  BP(mean): --  RR: 17 (07-16-20 @ 13:47) (16 - 18)  SpO2: 96% (07-16-20 @ 13:47) (96% - 100%)    PHYSICAL EXAM:  Constitutional: NAD, well-groomed, well-developed  HEENT: PERRLA, EOMI, Normal Hearing, MMM  Neck: No LAD, No JVD  Back: Normal spine flexure, No CVA tenderness  Respiratory: CTAB  Cardiovascular: S1 and S2, RRR, no M/G/R  Gastrointestinal: BS+, soft, NT/ND  Extremities: No peripheral edema  Vascular: 2+ peripheral pulses  Neurological: A/O x 3, no focal deficits  Psychiatric: Normal mood, normal affect  Musculoskeletal: 5/5 strength b/l upper and lower extremities  Skin: No rashes        CBC Full  -  ( 16 Jul 2020 11:10 )  WBC Count : 6.87 K/uL  RBC Count : 3.26 M/uL  Hemoglobin : 8.1 g/dL  Hematocrit : 26.6 %  Platelet Count - Automated : 303 K/uL  Mean Cell Volume : 81.6 fl  Mean Cell Hemoglobin : 24.8 pg  Mean Cell Hemoglobin Concentration : 30.5 gm/dL  Auto Neutrophil # : x  Auto Lymphocyte # : x  Auto Monocyte # : x  Auto Eosinophil # : x  Auto Basophil # : x  Auto Neutrophil % : x  Auto Lymphocyte % : x  Auto Monocyte % : x  Auto Eosinophil % : x  Auto Basophil % : x      INR: 1.03 (07-14-20 @ 08:25)  Activated Partial Thromboplastin Time: 27.9 sec (07-14-20 @ 08:25)  D-Dimer Assay, Quantitative: 237 ng/mL DDU (07-14-20 @ 06:50)      Iron Total, Serum: 12 ug/dL (07-14 @ 12:41)  Iron - Total Binding Capacity.: 437 ug/dL (07-14 @ 12:41)  Ferritin, Serum: 16 ng/mL (07-14 @ 12:41)    07-16    140  |  106  |  7   ----------------------------<  115<H>  3.7   |  26  |  0.85    Ca    8.7      16 Jul 2020 11:10  Phos  3.1     07-15  Mg     2.0     07-15    TPro  x   /  Alb  x   /  TBili  0.8  /  DBili  <0.2  /  AST  x   /  ALT  x   /  AlkPhos  x   07-15    Pathology:

## 2020-07-16 NOTE — PROGRESS NOTE ADULT - PROBLEM SELECTOR PLAN 5
F: no IVF  E: K>4 Mg>2, monitor and replete as needed  N: regular diet  DVT ppx: Heparin Subq 5000 units q12  GI ppx: Protonix PO      D: RMF    FULL CODE.
Patient found to have Plt 99 on initial CBC with evidence of plt clumping. Repeat CBC showing Plt 312, most recently 290. Pt with history of alcohol use but no history of bleeding or clotting disorders. No evidence of petechiae or purpura on PE.   - trend CBC

## 2020-07-16 NOTE — PROGRESS NOTE ADULT - PROBLEM SELECTOR PLAN 3
Patient hepatitis positive on admission labwork. He initially had a transaminitis to the 60s, but most recent LFTs downtrended to the 30s.   -f/u LFTs Patient hepatitis positive on admission labwork. He initially had a transaminitis to the 60s, but most recent LFTs downtrended to the 30s.   -f/u LFTs  -f/u reflex PCR

## 2020-07-16 NOTE — PROGRESS NOTE ADULT - SUBJECTIVE AND OBJECTIVE BOX
GASTROENTEROLOGY PROGRESS NOTE  Patient seen and examined at bedside. New events noted. Hb 8.1 thia am. Received IV iron x 2    PERTINENT REVIEW OF SYSTEMS:  CONSTITUTIONAL: No weakness, fevers or chills  HEENT: No visual changes; No vertigo or throat pain   GASTROINTESTINAL: No abdominal or epigastric pain. No nausea, vomiting, or hematemesis; No diarrhea or constipation. No melena or hematochezia.  NEUROLOGICAL: AAOX3  SKIN: B/l LE swelling with chronic skin changes    Allergies    iodine (Other)    Intolerances      MEDICATIONS:  MEDICATIONS  (STANDING):  cephalexin 500 milliGRAM(s) Oral four times a day  heparin   Injectable 5000 Unit(s) SubCutaneous every 8 hours  iron sucrose IVPB 200 milliGRAM(s) IV Intermittent every 24 hours  pantoprazole    Tablet 40 milliGRAM(s) Oral before breakfast  polyethylene glycol/electrolyte Solution. 2000 milliLiter(s) Oral once    MEDICATIONS  (PRN):  diphenhydrAMINE 25 milliGRAM(s) Oral every 4 hours PRN Rash and/or Itching  loperamide 2 milliGRAM(s) Oral every 6 hours PRN Diarrhea  ondansetron Injectable 8 milliGRAM(s) IV Push every 4 hours PRN Nausea and/or Vomiting    Vital Signs Last 24 Hrs  T(C): 36.9 (16 Jul 2020 05:23), Max: 37.1 (15 Jul 2020 21:00)  T(F): 98.4 (16 Jul 2020 05:23), Max: 98.7 (15 Jul 2020 21:00)  HR: 66 (16 Jul 2020 05:23) (66 - 68)  BP: 149/81 (16 Jul 2020 05:23) (139/71 - 149/81)  BP(mean): --  RR: 18 (16 Jul 2020 05:23) (16 - 18)  SpO2: 99% (16 Jul 2020 05:23) (99% - 100%)    07-15 @ 07:01  -  07-16 @ 07:00  --------------------------------------------------------  IN: 0 mL / OUT: 350 mL / NET: -350 mL      PHYSICAL EXAM:    General: Well developed; well nourished; in no acute distress  HEENT: MMM, conjunctiva and sclera clear  Gastrointestinal: Soft non-tender non-distended; Normal bowel sounds; No hepatosplenomegaly. No rebound or guarding  Skin: B/l LE swelling with chronic skin changes    LABS:                        8.1    6.87  )-----------( 303      ( 16 Jul 2020 11:10 )             26.6     07-16    140  |  106  |  7   ----------------------------<  115<H>  3.7   |  26  |  0.85    Ca    8.7      16 Jul 2020 11:10  Phos  3.1     07-15  Mg     2.0     07-15    TPro  x   /  Alb  x   /  TBili  0.8  /  DBili  <0.2  /  AST  x   /  ALT  x   /  AlkPhos  x   07-15                      Culture - Urine (collected 14 Jul 2020 11:59)  Source: .Urine Clean Catch (Midstream)  Final Report (15 Jul 2020 11:13):    No growth      RADIOLOGY & ADDITIONAL STUDIES:  Reviewed

## 2020-07-21 DIAGNOSIS — F19.99 OTHER PSYCHOACTIVE SUBSTANCE USE, UNSPECIFIED WITH UNSPECIFIED PSYCHOACTIVE SUBSTANCE-INDUCED DISORDER: ICD-10-CM

## 2020-07-21 DIAGNOSIS — R01.1 CARDIAC MURMUR, UNSPECIFIED: ICD-10-CM

## 2020-07-21 DIAGNOSIS — R60.9 EDEMA, UNSPECIFIED: ICD-10-CM

## 2020-07-21 DIAGNOSIS — I87.2 VENOUS INSUFFICIENCY (CHRONIC) (PERIPHERAL): ICD-10-CM

## 2020-07-21 DIAGNOSIS — D69.6 THROMBOCYTOPENIA, UNSPECIFIED: ICD-10-CM

## 2020-07-21 DIAGNOSIS — D50.9 IRON DEFICIENCY ANEMIA, UNSPECIFIED: ICD-10-CM

## 2020-07-21 DIAGNOSIS — F14.99 COCAINE USE, UNSPECIFIED WITH UNSPECIFIED COCAINE-INDUCED DISORDER: ICD-10-CM

## 2020-07-21 DIAGNOSIS — L03.116 CELLULITIS OF LEFT LOWER LIMB: ICD-10-CM

## 2020-11-02 ENCOUNTER — EMERGENCY (EMERGENCY)
Facility: HOSPITAL | Age: 35
LOS: 1 days | Discharge: HOME | End: 2020-11-02
Admitting: EMERGENCY MEDICINE
Payer: MEDICAID

## 2020-11-02 VITALS
OXYGEN SATURATION: 94 % | TEMPERATURE: 98 F | HEART RATE: 105 BPM | RESPIRATION RATE: 18 BRPM | SYSTOLIC BLOOD PRESSURE: 155 MMHG | HEIGHT: 71 IN | DIASTOLIC BLOOD PRESSURE: 83 MMHG

## 2020-11-02 VITALS
SYSTOLIC BLOOD PRESSURE: 135 MMHG | HEART RATE: 98 BPM | DIASTOLIC BLOOD PRESSURE: 69 MMHG | OXYGEN SATURATION: 95 % | TEMPERATURE: 98 F | RESPIRATION RATE: 18 BRPM

## 2020-11-02 DIAGNOSIS — Z98.890 OTHER SPECIFIED POSTPROCEDURAL STATES: Chronic | ICD-10-CM

## 2020-11-02 DIAGNOSIS — L03.116 CELLULITIS OF LEFT LOWER LIMB: ICD-10-CM

## 2020-11-02 DIAGNOSIS — Z91.048 OTHER NONMEDICINAL SUBSTANCE ALLERGY STATUS: ICD-10-CM

## 2020-11-02 PROBLEM — I83.90 ASYMPTOMATIC VARICOSE VEINS OF UNSPECIFIED LOWER EXTREMITY: Chronic | Status: ACTIVE | Noted: 2020-07-14

## 2020-11-02 PROBLEM — R60.9 EDEMA, UNSPECIFIED: Chronic | Status: ACTIVE | Noted: 2020-07-14

## 2020-11-02 PROBLEM — L03.90 CELLULITIS, UNSPECIFIED: Chronic | Status: ACTIVE | Noted: 2020-07-14

## 2020-11-02 LAB
ALBUMIN SERPL ELPH-MCNC: 3.3 G/DL — LOW (ref 3.4–5)
ALP SERPL-CCNC: 102 U/L — SIGNIFICANT CHANGE UP (ref 40–120)
ALT FLD-CCNC: 50 U/L — HIGH (ref 12–42)
ANION GAP SERPL CALC-SCNC: 8 MMOL/L — LOW (ref 9–16)
APTT BLD: 31.8 SEC — SIGNIFICANT CHANGE UP (ref 27.5–35.5)
AST SERPL-CCNC: 32 U/L — SIGNIFICANT CHANGE UP (ref 15–37)
BASOPHILS # BLD AUTO: 0.01 K/UL — SIGNIFICANT CHANGE UP (ref 0–0.2)
BASOPHILS NFR BLD AUTO: 0.2 % — SIGNIFICANT CHANGE UP (ref 0–2)
BILIRUB SERPL-MCNC: 0.3 MG/DL — SIGNIFICANT CHANGE UP (ref 0.2–1.2)
BUN SERPL-MCNC: 11 MG/DL — SIGNIFICANT CHANGE UP (ref 7–23)
CALCIUM SERPL-MCNC: 9.4 MG/DL — SIGNIFICANT CHANGE UP (ref 8.5–10.5)
CHLORIDE SERPL-SCNC: 99 MMOL/L — SIGNIFICANT CHANGE UP (ref 96–108)
CO2 SERPL-SCNC: 28 MMOL/L — SIGNIFICANT CHANGE UP (ref 22–31)
CREAT SERPL-MCNC: 0.73 MG/DL — SIGNIFICANT CHANGE UP (ref 0.5–1.3)
EOSINOPHIL # BLD AUTO: 0.14 K/UL — SIGNIFICANT CHANGE UP (ref 0–0.5)
EOSINOPHIL NFR BLD AUTO: 2.6 % — SIGNIFICANT CHANGE UP (ref 0–6)
GLUCOSE SERPL-MCNC: 92 MG/DL — SIGNIFICANT CHANGE UP (ref 70–99)
HCT VFR BLD CALC: 36.9 % — LOW (ref 39–50)
HGB BLD-MCNC: 11.4 G/DL — LOW (ref 13–17)
IMM GRANULOCYTES NFR BLD AUTO: 0.4 % — SIGNIFICANT CHANGE UP (ref 0–1.5)
INR BLD: 1.09 — SIGNIFICANT CHANGE UP (ref 0.88–1.16)
LYMPHOCYTES # BLD AUTO: 1.1 K/UL — SIGNIFICANT CHANGE UP (ref 1–3.3)
LYMPHOCYTES # BLD AUTO: 20.4 % — SIGNIFICANT CHANGE UP (ref 13–44)
MAGNESIUM SERPL-MCNC: 1.6 MG/DL — SIGNIFICANT CHANGE UP (ref 1.6–2.6)
MCHC RBC-ENTMCNC: 27 PG — SIGNIFICANT CHANGE UP (ref 27–34)
MCHC RBC-ENTMCNC: 30.9 GM/DL — LOW (ref 32–36)
MCV RBC AUTO: 87.4 FL — SIGNIFICANT CHANGE UP (ref 80–100)
MONOCYTES # BLD AUTO: 0.54 K/UL — SIGNIFICANT CHANGE UP (ref 0–0.9)
MONOCYTES NFR BLD AUTO: 10 % — SIGNIFICANT CHANGE UP (ref 2–14)
NEUTROPHILS # BLD AUTO: 3.57 K/UL — SIGNIFICANT CHANGE UP (ref 1.8–7.4)
NEUTROPHILS NFR BLD AUTO: 66.4 % — SIGNIFICANT CHANGE UP (ref 43–77)
NRBC # BLD: 0 /100 WBCS — SIGNIFICANT CHANGE UP (ref 0–0)
PLATELET # BLD AUTO: 302 K/UL — SIGNIFICANT CHANGE UP (ref 150–400)
POTASSIUM SERPL-MCNC: 4.1 MMOL/L — SIGNIFICANT CHANGE UP (ref 3.5–5.3)
POTASSIUM SERPL-SCNC: 4.1 MMOL/L — SIGNIFICANT CHANGE UP (ref 3.5–5.3)
PROT SERPL-MCNC: 8.1 G/DL — SIGNIFICANT CHANGE UP (ref 6.4–8.2)
PROTHROM AB SERPL-ACNC: 13 SEC — SIGNIFICANT CHANGE UP (ref 10.6–13.6)
RBC # BLD: 4.22 M/UL — SIGNIFICANT CHANGE UP (ref 4.2–5.8)
RBC # FLD: 18.5 % — HIGH (ref 10.3–14.5)
SODIUM SERPL-SCNC: 135 MMOL/L — SIGNIFICANT CHANGE UP (ref 132–145)
WBC # BLD: 5.38 K/UL — SIGNIFICANT CHANGE UP (ref 3.8–10.5)
WBC # FLD AUTO: 5.38 K/UL — SIGNIFICANT CHANGE UP (ref 3.8–10.5)

## 2020-11-02 PROCEDURE — 99285 EMERGENCY DEPT VISIT HI MDM: CPT

## 2020-11-02 PROCEDURE — 73701 CT LOWER EXTREMITY W/DYE: CPT | Mod: 26,LT

## 2020-11-02 RX ORDER — GABAPENTIN 400 MG/1
600 CAPSULE ORAL ONCE
Refills: 0 | Status: COMPLETED | OUTPATIENT
Start: 2020-11-02 | End: 2020-11-02

## 2020-11-02 RX ORDER — OXYCODONE AND ACETAMINOPHEN 5; 325 MG/1; MG/1
1 TABLET ORAL ONCE
Refills: 0 | Status: DISCONTINUED | OUTPATIENT
Start: 2020-11-02 | End: 2020-11-02

## 2020-11-02 RX ORDER — DIPHENHYDRAMINE HCL 50 MG
25 CAPSULE ORAL ONCE
Refills: 0 | Status: COMPLETED | OUTPATIENT
Start: 2020-11-02 | End: 2020-11-02

## 2020-11-02 RX ADMIN — Medication 1 TABLET(S): at 13:34

## 2020-11-02 RX ADMIN — Medication 25 MILLIGRAM(S): at 11:42

## 2020-11-02 RX ADMIN — OXYCODONE AND ACETAMINOPHEN 1 TABLET(S): 5; 325 TABLET ORAL at 10:49

## 2020-11-02 RX ADMIN — GABAPENTIN 600 MILLIGRAM(S): 400 CAPSULE ORAL at 10:49

## 2020-11-02 NOTE — ED PROVIDER NOTE - CHPI ED SYMPTOMS NEG
no vomiting/no fever/no nausea/no numbness/no tingling/no decreased eating/drinking/no weakness/no pain/no chills

## 2020-11-02 NOTE — ED PROVIDER NOTE - CLINICAL SUMMARY MEDICAL DECISION MAKING FREE TEXT BOX
Patient here with left leg pain at site of BKA   Patient will get CT r/o infection   symptomatic Treatment Patient here with left leg pain at site of BKA   Patient will get CT r/o infection   symptomatic Treatment  Patient on abx but will send new script ans noncompliance is suspected
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
## 2020-11-02 NOTE — ED PROVIDER NOTE - OBJECTIVE STATEMENT
34 y/o male hx of left BKA now here with pain at the bottom of BKA and states he was at [facility] yesterday and had an ultrasound that showed a collection of fluid for which he received abx for. Patient denies chest pain,nausea,vomiting,diarrhea,fevers,chills,sob,trauma,loc. Patient appears well NAD. Patient appears Well NAD states he ran out of all he medications and is asking for food.

## 2020-11-02 NOTE — ED PROVIDER NOTE - PATIENT PORTAL LINK FT
You can access the FollowMyHealth Patient Portal offered by [facility] by registering at the following website: [email or web address]. By joining "[employer]"’s FollowMyHealth portal, you will also be able to view your health information using other applications (apps) compatible with our system.

## 2020-11-02 NOTE — ED ADULT NURSE NOTE - NSIMPLEMENTINTERV_GEN_ALL_ED
Implemented All Fall with Harm Risk Interventions:  [place] to call system. Call bell, personal items and telephone within reach. Instruct patient to call for assistance. Room bathroom lighting operational. Non-slip footwear when patient is off stretcher. Physically safe environment: no spills, clutter or unnecessary equipment. Stretcher in lowest position, wheels locked, appropriate side rails in place. Provide visual cue, wrist band, yellow gown, etc. Monitor gait and stability. Monitor for mental status changes and reorient to person, place, and time. Review medications for side effects contributing to fall risk. Reinforce activity limits and safety measures with patient and family. Provide visual clues: red socks.

## 2020-11-03 RX ORDER — CEPHALEXIN 500 MG
1 CAPSULE ORAL
Qty: 40 | Refills: 0
Start: 2020-11-03 | End: 2020-11-12

## 2020-11-03 RX ORDER — AZTREONAM 2 G
1 VIAL (EA) INJECTION
Qty: 20 | Refills: 0
Start: 2020-11-03 | End: 2020-11-12

## 2022-11-28 NOTE — H&P ADULT - PROBLEM SELECTOR PLAN 2
Mirvaso Counseling: Mirvaso is a topical medication which can decrease superficial blood flow where applied. Side effects are uncommon and include stinging, redness and allergic reactions. Patient found to have Plt 99 on initial CBC with evidence of plt clumping. Repeat CBC showing Plt 312. Pt with history of alcohol use but no history of bleeding or clotting disorders. No evidence of petechiae or purpura on PE.   - Will obtain 3rd platelet count in heparinized tube   - Tranfuse plt if plt <10k or <20k if bleeding

## 2024-04-24 NOTE — PROGRESS NOTE ADULT - PROBLEM/PLAN-1
DISPLAY PLAN FREE TEXT
[Negative] : Endocrine
[TextBox_30] : see Hpi